# Patient Record
Sex: FEMALE | Race: WHITE | URBAN - METROPOLITAN AREA
[De-identification: names, ages, dates, MRNs, and addresses within clinical notes are randomized per-mention and may not be internally consistent; named-entity substitution may affect disease eponyms.]

---

## 2017-01-05 DIAGNOSIS — Z12.11 SPECIAL SCREENING FOR MALIGNANT NEOPLASMS, COLON: ICD-10-CM

## 2017-01-05 DIAGNOSIS — G57.01 LESION OF SCIATIC NERVE, RIGHT: Primary | ICD-10-CM

## 2017-01-06 RX ORDER — CYCLOBENZAPRINE HCL 10 MG
5-10 TABLET ORAL
Qty: 30 TABLET | Refills: 0 | Status: SHIPPED | OUTPATIENT
Start: 2017-01-06 | End: 2019-01-04

## 2017-01-06 NOTE — TELEPHONE ENCOUNTER
Left voice message asking patient to call triage back.   Rx was sent to pharmacy but PCP is requesting appt to discuss other medications.  Asked pt to call back to review details of PCP's message.

## 2017-01-06 NOTE — TELEPHONE ENCOUNTER
Patient really needs a follow up appointment - was supposed to follow up in December for start of SSRI, sleep, and cholesterol (didn't want to start med, but I'd like to discuss with her).    Could you let her know I'll fill for one time, but should make appointment for further refills (and to discuss above)?    ALSO NEEDS TO SEND IN FIT!!    Thanks,  Lacey Morley MD

## 2017-01-06 NOTE — TELEPHONE ENCOUNTER
cyclobenzaprine (FLEXERIL) 10 MG tablet   Last Written Prescription Date:  11/23/16  Last Fill Quantity: 30,   # refills: 1  Last Office Visit with [facility], [facility] or [facility] prescribing provider: 11/23/16, complete physical exam   Future Office visit:       Routing refill request to provider for review/approval because:  Drug not on the [facility], [facility] or [facility] refill protocol or controlled substance    Needs Dx

## 2017-01-09 NOTE — TELEPHONE ENCOUNTER
Patient called back clinic, she is unable to make a f/u appt until March, no insurance for January and February.   She stopped taking Zoloft after 2-3 days because she had very loose diarrhea, couple times she barely made it to the restroom, diarrhea went away when she stopped.  She still has 2 months left of Zoloft at home. Asking if diarrhea would go away eventually if she restarts it or should she take a lower dose?   Requests a quick call from PCP.   Will route to PCP, okay to be addressed when back in the clinic.

## 2017-10-03 ENCOUNTER — OFFICE VISIT (OUTPATIENT)
Dept: FAMILY MEDICINE | Facility: CLINIC | Age: 67
End: 2017-10-03
Payer: MEDICARE

## 2017-10-03 ENCOUNTER — RADIANT APPOINTMENT (OUTPATIENT)
Dept: GENERAL RADIOLOGY | Facility: CLINIC | Age: 67
End: 2017-10-03
Attending: FAMILY MEDICINE
Payer: MEDICARE

## 2017-10-03 ENCOUNTER — TELEPHONE (OUTPATIENT)
Dept: FAMILY MEDICINE | Facility: CLINIC | Age: 67
End: 2017-10-03

## 2017-10-03 VITALS
WEIGHT: 210 LBS | DIASTOLIC BLOOD PRESSURE: 64 MMHG | BODY MASS INDEX: 33.89 KG/M2 | RESPIRATION RATE: 16 BRPM | TEMPERATURE: 98.5 F | OXYGEN SATURATION: 94 % | SYSTOLIC BLOOD PRESSURE: 120 MMHG | HEART RATE: 88 BPM

## 2017-10-03 DIAGNOSIS — S92.515A CLOSED NONDISPLACED FRACTURE OF PROXIMAL PHALANX OF LESSER TOE OF LEFT FOOT, INITIAL ENCOUNTER: ICD-10-CM

## 2017-10-03 DIAGNOSIS — S99.922A INJURY OF TOE, LEFT, INITIAL ENCOUNTER: ICD-10-CM

## 2017-10-03 DIAGNOSIS — S99.922A INJURY OF TOE, LEFT, INITIAL ENCOUNTER: Primary | ICD-10-CM

## 2017-10-03 PROCEDURE — 73660 X-RAY EXAM OF TOE(S): CPT | Mod: LT

## 2017-10-03 PROCEDURE — 99214 OFFICE O/P EST MOD 30 MIN: CPT | Performed by: FAMILY MEDICINE

## 2017-10-03 NOTE — PROGRESS NOTES
"  SUBJECTIVE:   Racquel Torres is a 67 year old female who presents to clinic today for the following health issues:    toe      Duration: 1 day    Description (location/character/radiation): pinky toe on left foot    Intensity:  moderate    Accompanying signs and symptoms: pain, hard to put pressure on it    History (similar episodes/previous evaluation): None    Precipitating or alleviating factors: None    Therapies tried and outcome: ice     67 year old who stubbed toe last night and looked down and \"pinky toe pushed out in weird direction!\".  Painful, but pushed it back into place and \"heard a clunk\"  Since then red, tender, painful and hard to walk but much better today than yesterday.  Wants to be sure not broken.  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    Problem list and histories reviewed & adjusted, as indicated.  Additional history: as documented    Reviewed and updated as needed this visit by clinical staffTobacco  Allergies  Meds  Med Hx  Surg Hx  Fam Hx  Soc Hx      Reviewed and updated as needed this visit by Provider  Meds       ROS:  Constitutional, HEENT, cardiovascular, pulmonary, gi and gu systems are negative, except as otherwise noted.  OBJECTIVE:   /64  Pulse 88  Temp 98.5  F (36.9  C) (Tympanic)  Resp 16  Wt 210 lb (95.3 kg)  SpO2 94%  BMI 33.89 kg/m2  Body mass index is 33.89 kg/(m^2).  GENERAL: healthy, alert and no distress  MS: LLE exam shows left lateral distal foot by pinky toe erythematous and swollen.  Tender to touch.  Good ROM of all 5 digits and good blood flow and cap refill. Sensation intact.    Diagnostic Test Results:  Xray read by me: I think I see small non-displaced fracture in left distal area of proximal phalanx of 5th digit    ASSESSMENT/PLAN:         Racquel was seen today for toe injury.    Diagnoses and all orders for this visit:    Injury of toe, left, initial encounter  Comments:  Fx.  Work note given.  Discussed keep still, avoid " bending.  Follow up if not improving/resolve by 6 weeks, sooner if worsening.  Orders:  -     XR Toe Left G/E 2 Views; Future  -     acetaminophen-codeine (TYLENOL #3) 300-30 MG per tablet; Take 1-2 tablets by mouth nightly as needed for pain maximum 2 tablet(s) per day    Closed nondisplaced fracture of proximal phalanx of lesser toe of left foot, initial encounter        Patient Instructions     Closed Toe Fracture  Your toe is broken (fractured). This causes local pain, swelling, and sometimes bruising. This injury usually takes about 4 to 6 weeks to heal, but can sometimes take longer. Toe injuries are often treated by taping the injured toe to the next one (buddy taping). This protects the injured toe and holds it in position.     If the toenail has been severely injured, it may fall off in 1 to 2 weeks. It takes up to 12 months for a new toenail to grow back.  Home care  Follow these guidelines when caring for yourself at home:    You may be given a cast shoe to wear to keep your toe from moving. If not, you can use a sandal or any shoe that doesn t put pressure on the injured toe until the swelling and pain go away. If using a sandal, be careful not to strike your foot against anything. Another injury could make the fracture worse. If you were given crutches, don t put full weight on the injured foot until you can do so without pain, or as directed by your healthcare provider.    Keep your foot elevated to reduce pain and swelling. When sleeping, put a pillow under the injured leg. When sitting, support the injured leg so it is above your waist. This is very important during the first 2 days (48 hours).    Put an ice pack on the injured area. Do this for 20 minutes every 1 to 2 hours the first day for pain relief. You can make an ice pack by wrapping a plastic bag of ice cubes in a thin towel. As the ice melts, be careful that any cloth or paper tape doesn t get wet. Continue using the ice pack 3 to 4 times a  day for the next 2 days. Then use the ice pack as needed to ease pain and swelling.    If buddy tape was used and it becomes wet or dirty, change it. You may replace it with paper, plastic, or cloth tape. Cloth tape and paper tapes must be kept dry.    You may use acetaminophen or ibuprofen to control pain, unless another pain medicine was prescribed. If you have chronic liver or kidney disease, talk with your healthcare provider before using these medicines. Also talk with your provider if you ve had a stomach ulcer or gastrointestinal bleeding.    You may return to sports or physical education activities after 4 weeks when you can run without pain, or as directed by your healthcare provider.  Follow-up care  Follow up with your healthcare provider in 1 week, or as advised. This is to make sure the bone is healing the way it should.  X-rays may be taken. You will be told of any new findings that may affect your care.  When to seek medical advice  Call your healthcare provider right away if any of these occur:    Pain or swelling gets worse    The cast/splint cracks    The cast and padding get wet and stays wet more than 24 hours    Bad odor from the cast/splint or wound fluid stains the cast    Tightness or pressure under the cast/splint gets worse    Toe becomes cold, blue, numb, or tingly    You can t move the toe    Signs of infection: fever, redness, warmth, swelling, or drainage from the wound or cast    Fever of 100.4 F (38 C) or higher, or as directed by your healthcare provider  Date Last Reviewed: 2/1/2017 2000-2017 [employer]. [address] [place] [address], [place] [address]. All rights reserved. This information is not intended as a substitute for professional medical care. Always follow your healthcare professional's instructions.            Lacey Morley MD  [facility]

## 2017-10-03 NOTE — NURSING NOTE
"Chief Complaint   Patient presents with     Toe Injury       Initial /64  Pulse 88  Temp 98.5  F (36.9  C) (Tympanic)  Resp 16  Wt 210 lb (95.3 kg)  SpO2 94%  BMI 33.89 kg/m2 Estimated body mass index is 33.89 kg/(m^2) as calculated from the following:    Height as of 11/23/16: 5' 6\" (1.676 m).    Weight as of this encounter: 210 lb (95.3 kg).  Medication Reconciliation: complete   .Yue RECINOS      "

## 2017-10-03 NOTE — LETTER
[place] [id number] [facility]  [address]  [facility] [address]  Phone: [phone]  Fax: [phone]    October 3, 2017        Racquel Torres  [address]  [place] [address]          To whom it may concern:    RE: Racquel Torres    Patient was seen and treated today at our clinic and missed work.  Patient may return to work 10/5/17 with the following:  Light duty-must be able to sit to rest and elevate foot.  Walk/Stand: As tolerated, please allow her to rest and put up foot as needed.    Please contact me for questions or concerns.      Sincerely,        Lacey Morley MD

## 2017-10-03 NOTE — TELEPHONE ENCOUNTER
Patient pushed toe back in place , iced it . Painful, swollen.  Appointment madefor today to r/o fracture

## 2017-10-03 NOTE — PATIENT INSTRUCTIONS
Closed Toe Fracture  Your toe is broken (fractured). This causes local pain, swelling, and sometimes bruising. This injury usually takes about 4 to 6 weeks to heal, but can sometimes take longer. Toe injuries are often treated by taping the injured toe to the next one (buddy taping). This protects the injured toe and holds it in position.     If the toenail has been severely injured, it may fall off in 1 to 2 weeks. It takes up to 12 months for a new toenail to grow back.  Home care  Follow these guidelines when caring for yourself at home:    You may be given a cast shoe to wear to keep your toe from moving. If not, you can use a sandal or any shoe that doesn t put pressure on the injured toe until the swelling and pain go away. If using a sandal, be careful not to strike your foot against anything. Another injury could make the fracture worse. If you were given crutches, don t put full weight on the injured foot until you can do so without pain, or as directed by your healthcare provider.    Keep your foot elevated to reduce pain and swelling. When sleeping, put a pillow under the injured leg. When sitting, support the injured leg so it is above your waist. This is very important during the first 2 days (48 hours).    Put an ice pack on the injured area. Do this for 20 minutes every 1 to 2 hours the first day for pain relief. You can make an ice pack by wrapping a plastic bag of ice cubes in a thin towel. As the ice melts, be careful that any cloth or paper tape doesn t get wet. Continue using the ice pack 3 to 4 times a day for the next 2 days. Then use the ice pack as needed to ease pain and swelling.    If buddy tape was used and it becomes wet or dirty, change it. You may replace it with paper, plastic, or cloth tape. Cloth tape and paper tapes must be kept dry.    You may use acetaminophen or ibuprofen to control pain, unless another pain medicine was prescribed. If you have chronic liver or kidney disease,  talk with your healthcare provider before using these medicines. Also talk with your provider if you ve had a stomach ulcer or gastrointestinal bleeding.    You may return to sports or physical education activities after 4 weeks when you can run without pain, or as directed by your healthcare provider.  Follow-up care  Follow up with your healthcare provider in 1 week, or as advised. This is to make sure the bone is healing the way it should.  X-rays may be taken. You will be told of any new findings that may affect your care.  When to seek medical advice  Call your healthcare provider right away if any of these occur:    Pain or swelling gets worse    The cast/splint cracks    The cast and padding get wet and stays wet more than 24 hours    Bad odor from the cast/splint or wound fluid stains the cast    Tightness or pressure under the cast/splint gets worse    Toe becomes cold, blue, numb, or tingly    You can t move the toe    Signs of infection: fever, redness, warmth, swelling, or drainage from the wound or cast    Fever of 100.4 F (38 C) or higher, or as directed by your healthcare provider  Date Last Reviewed: 2/1/2017 2000-2017 [employer]. [address] [place] [address]. All rights reserved. This information is not intended as a substitute for professional medical care. Always follow your healthcare professional's instructions.

## 2017-10-03 NOTE — TELEPHONE ENCOUNTER
Reason for call:  Patient reporting a symptom    Symptom or request:    Injured toe   Hurt toe last night    It went sideways (sticking out)   Her daughter told her to push it back in so she did    Now she wants to know what she should do    Duration (how long have symptoms been present): one day    Have you been treated for this before? No    Additional comments:   Please call the patient    Phone Number patient can be reached at:  Other phone number:    [phone]  Best Time:   anytime    Can we leave a detailed message on this number:  YES    Call taken on 10/3/2017 at 8:23 AM by JACQUI ASH

## 2017-10-03 NOTE — MR AVS SNAPSHOT
After Visit Summary   10/3/2017    Racquel Torres    MRN: [id number]           Patient Information     Date Of Birth          1950        Visit Information        Provider Department      10/3/2017 1:20 PM Lacey Morley MD [facility]        Today's Diagnoses     Injury of toe, left, initial encounter    -  1    Closed nondisplaced fracture of proximal phalanx of lesser toe of left foot, initial encounter          Care Instructions      Closed Toe Fracture  Your toe is broken (fractured). This causes local pain, swelling, and sometimes bruising. This injury usually takes about 4 to 6 weeks to heal, but can sometimes take longer. Toe injuries are often treated by taping the injured toe to the next one (gómez taping). This protects the injured toe and holds it in position.     If the toenail has been severely injured, it may fall off in 1 to 2 weeks. It takes up to 12 months for a new toenail to grow back.  Home care  Follow these guidelines when caring for yourself at home:    You may be given a cast shoe to wear to keep your toe from moving. If not, you can use a sandal or any shoe that doesn t put pressure on the injured toe until the swelling and pain go away. If using a sandal, be careful not to strike your foot against anything. Another injury could make the fracture worse. If you were given crutches, don t put full weight on the injured foot until you can do so without pain, or as directed by your healthcare provider.    Keep your foot elevated to reduce pain and swelling. When sleeping, put a pillow under the injured leg. When sitting, support the injured leg so it is above your waist. This is very important during the first 2 days (48 hours).    Put an ice pack on the injured area. Do this for 20 minutes every 1 to 2 hours the first day for pain relief. You can make an ice pack by wrapping a plastic bag of ice cubes in a thin towel. As the ice melts,  be careful that any cloth or paper tape doesn t get wet. Continue using the ice pack 3 to 4 times a day for the next 2 days. Then use the ice pack as needed to ease pain and swelling.    If buddy tape was used and it becomes wet or dirty, change it. You may replace it with paper, plastic, or cloth tape. Cloth tape and paper tapes must be kept dry.    You may use acetaminophen or ibuprofen to control pain, unless another pain medicine was prescribed. If you have chronic liver or kidney disease, talk with your healthcare provider before using these medicines. Also talk with your provider if you ve had a stomach ulcer or gastrointestinal bleeding.    You may return to sports or physical education activities after 4 weeks when you can run without pain, or as directed by your healthcare provider.  Follow-up care  Follow up with your healthcare provider in 1 week, or as advised. This is to make sure the bone is healing the way it should.  X-rays may be taken. You will be told of any new findings that may affect your care.  When to seek medical advice  Call your healthcare provider right away if any of these occur:    Pain or swelling gets worse    The cast/splint cracks    The cast and padding get wet and stays wet more than 24 hours    Bad odor from the cast/splint or wound fluid stains the cast    Tightness or pressure under the cast/splint gets worse    Toe becomes cold, blue, numb, or tingly    You can t move the toe    Signs of infection: fever, redness, warmth, swelling, or drainage from the wound or cast    Fever of 100.4 F (38 C) or higher, or as directed by your healthcare provider  Date Last Reviewed: 2/1/2017 2000-2017 [employer]. [address] [place] [address], [place] [address]. All rights reserved. This information is not intended as a substitute for professional medical care. Always follow your healthcare professional's instructions.                Follow-ups after your visit        Who to contact  "    If you have questions or need follow up information about today's clinic visit or your schedule please contact [facility] directly at [phone].  Normal or non-critical lab and imaging results will be communicated to you by MyChart, letter or phone within 4 business days after the clinic has received the results. If you do not hear from us within 7 days, please contact the clinic through [employer] or phone. If you have a critical or abnormal lab result, we will notify you by phone as soon as possible.  Submit refill requests through [employer] or call your pharmacy and they will forward the refill request to us. Please allow 3 business days for your refill to be completed.          Additional Information About Your Visit        [employer] Information     [employer] lets you send messages to your doctor, view your test results, renew your prescriptions, schedule appointments and more. To sign up, go to [email or web address] . Click on \"Log in\" on the left side of the screen, which will take you to the Welcome page. Then click on \"Sign up Now\" on the right side of the page.     You will be asked to enter the access code listed below, as well as some personal information. Please follow the directions to create your username and password.     Your access code is: [email or web address]  Expires: 2018  5:42 PM     Your access code will  in 90 days. If you need help or a new code, please call your [place] clinic or [phone].        Care EveryWhere ID     This is your Care EveryWhere ID. This could be used by other organizations to access your [place] medical records  [id number]        Your Vitals Were     Pulse Temperature Respirations Pulse Oximetry BMI (Body Mass Index)       88 98.5  F (36.9  C) (Tympanic) 16 94% 33.89 kg/m2        Blood Pressure from Last 3 Encounters:   10/03/17 120/64   16 120/70   09/16/15 120/87    Weight from Last 3 Encounters:   10/03/17 210 lb " (95.3 kg)   11/23/16 202 lb (91.6 kg)   02/23/15 205 lb (93 kg)                 Today's Medication Changes          These changes are accurate as of: 10/3/17  5:42 PM.  If you have any questions, ask your nurse or doctor.               Start taking these medicines.        Dose/Directions    acetaminophen-codeine 300-30 MG per tablet   Commonly known as:  TYLENOL #3   Used for:  Injury of toe, left, initial encounter   Started by:  Lacey Morley MD        Dose:  1-2 tablet   Take 1-2 tablets by mouth nightly as needed for pain maximum 2 tablet(s) per day   Quantity:  6 tablet   Refills:  0         Stop taking these medicines if you haven't already. Please contact your care team if you have questions.     sertraline 25 MG tablet   Commonly known as:  ZOLOFT   Stopped by:  Lacey Morley MD                Where to get your medicines      Some of these will need a paper prescription and others can be bought over the counter.  Ask your nurse if you have questions.     Bring a paper prescription for each of these medications     acetaminophen-codeine 300-30 MG per tablet                Primary Care Provider Office Phone # Fax #    Lacey Morley [phone]       [address] [place] [address]        Equal Access to Services     ALLEN HERMOSILLO AH: Kriss abreuo Sokaren, waaxda luqadaha, qaybta kaalmada adeegyada, bri wolfe. So [facility] [place] [phone].    ATENCIÓN: Si habla español, tiene a livingston disposición servicios gratuitos de asistencia lingüística. Llame al [phone].    We comply with applicable federal civil rights laws and [place] laws. We do not discriminate on the basis of race, color, national origin, age, disability, sex, sexual orientation, or gender identity.            Thank you!     Thank you for choosing [facility]  for your care. Our goal is always to provide you with excellent care. Hearing back from  our patients is one way we can continue to improve our services. Please take a few minutes to complete the written survey that you may receive in the mail after your visit with us. Thank you!             Your Updated Medication List - Protect others around you: Learn how to safely use, store and throw away your medicines at www.disposemymeds.org.          This list is accurate as of: 10/3/17  5:42 PM.  Always use your most recent med list.                   Brand Name Dispense Instructions for use Diagnosis    acetaminophen-codeine 300-30 MG per tablet    TYLENOL #3    6 tablet    Take 1-2 tablets by mouth nightly as needed for pain maximum 2 tablet(s) per day    Injury of toe, left, initial encounter       cyclobenzaprine 10 MG tablet    FLEXERIL    30 tablet    Take 0.5-1 tablets (5-10 mg) by mouth nightly as needed for muscle spasms    Lesion of sciatic nerve, right

## 2017-10-04 NOTE — PROGRESS NOTES
This lab was reviewed with patient in office; see my office visit note for details.   Lacey Morley MD

## 2017-10-18 ENCOUNTER — TELEPHONE (OUTPATIENT)
Dept: FAMILY MEDICINE | Facility: CLINIC | Age: 67
End: 2017-10-18

## 2017-10-18 DIAGNOSIS — Z12.11 SPECIAL SCREENING FOR MALIGNANT NEOPLASMS, COLON: Primary | ICD-10-CM

## 2017-10-18 NOTE — TELEPHONE ENCOUNTER
Agreed to FIT, will call back to schedule mammogram and stated she wanted to have the physical the same day. She will call back after reviewing her work schedule.

## 2017-10-18 NOTE — TELEPHONE ENCOUNTER
10/18/2017    Call Regarding Preventive Health Screening Colonoscopy and Mammogram    Attempt 1    Message on voicemail     Comments: Home: Invalid number      Outreach   CC

## 2017-10-18 NOTE — TELEPHONE ENCOUNTER
Called the patient for Health Maintenance, agreed to FIT test screening. Order placed for provider to sign.     The patient request FIT be sent to home address:  [address] [place] [address] [address] [place] [id number]

## 2018-05-07 ENCOUNTER — OFFICE VISIT (OUTPATIENT)
Dept: FAMILY MEDICINE | Facility: CLINIC | Age: 68
End: 2018-05-07
Payer: COMMERCIAL

## 2018-05-07 VITALS
OXYGEN SATURATION: 95 % | SYSTOLIC BLOOD PRESSURE: 120 MMHG | HEART RATE: 71 BPM | WEIGHT: 203 LBS | DIASTOLIC BLOOD PRESSURE: 64 MMHG | BODY MASS INDEX: 31.86 KG/M2 | HEIGHT: 67 IN | TEMPERATURE: 98.3 F | RESPIRATION RATE: 16 BRPM

## 2018-05-07 DIAGNOSIS — F17.210 CIGARETTE NICOTINE DEPENDENCE WITHOUT COMPLICATION: ICD-10-CM

## 2018-05-07 DIAGNOSIS — Z00.00 ROUTINE GENERAL MEDICAL EXAMINATION AT A HEALTH CARE FACILITY: Primary | ICD-10-CM

## 2018-05-07 DIAGNOSIS — Z12.2 ENCOUNTER FOR SCREENING FOR LUNG CANCER: ICD-10-CM

## 2018-05-07 DIAGNOSIS — R39.9 URINARY SYMPTOM OR SIGN: ICD-10-CM

## 2018-05-07 DIAGNOSIS — H43.393 FLOATERS IN VISUAL FIELD, BILATERAL: ICD-10-CM

## 2018-05-07 DIAGNOSIS — Z13.1 SCREENING FOR DIABETES MELLITUS: ICD-10-CM

## 2018-05-07 DIAGNOSIS — E78.5 HYPERLIPIDEMIA, UNSPECIFIED HYPERLIPIDEMIA TYPE: ICD-10-CM

## 2018-05-07 DIAGNOSIS — Z12.39 SCREENING FOR BREAST CANCER: ICD-10-CM

## 2018-05-07 DIAGNOSIS — Z78.0 ASYMPTOMATIC POSTMENOPAUSAL STATUS: ICD-10-CM

## 2018-05-07 DIAGNOSIS — Z12.11 SPECIAL SCREENING FOR MALIGNANT NEOPLASMS, COLON: ICD-10-CM

## 2018-05-07 LAB
ALBUMIN UR-MCNC: NEGATIVE MG/DL
APPEARANCE UR: CLEAR
BILIRUB UR QL STRIP: NEGATIVE
CHOLEST SERPL-MCNC: 194 MG/DL
COLOR UR AUTO: YELLOW
GLUCOSE SERPL-MCNC: 98 MG/DL (ref 70–99)
GLUCOSE UR STRIP-MCNC: NEGATIVE MG/DL
HDLC SERPL-MCNC: 40 MG/DL
HGB UR QL STRIP: NEGATIVE
KETONES UR STRIP-MCNC: NEGATIVE MG/DL
LDLC SERPL CALC-MCNC: 130 MG/DL
LEUKOCYTE ESTERASE UR QL STRIP: NEGATIVE
NITRATE UR QL: NEGATIVE
NONHDLC SERPL-MCNC: 154 MG/DL
PH UR STRIP: 5.5 PH (ref 5–7)
SOURCE: NORMAL
SP GR UR STRIP: 1.02 (ref 1–1.03)
TRIGL SERPL-MCNC: 119 MG/DL
UROBILINOGEN UR STRIP-ACNC: 1 EU/DL (ref 0.2–1)

## 2018-05-07 PROCEDURE — 36415 COLL VENOUS BLD VENIPUNCTURE: CPT | Performed by: FAMILY MEDICINE

## 2018-05-07 PROCEDURE — 99213 OFFICE O/P EST LOW 20 MIN: CPT | Mod: 25 | Performed by: FAMILY MEDICINE

## 2018-05-07 PROCEDURE — 99397 PER PM REEVAL EST PAT 65+ YR: CPT | Mod: 25 | Performed by: FAMILY MEDICINE

## 2018-05-07 PROCEDURE — 81003 URINALYSIS AUTO W/O SCOPE: CPT | Performed by: FAMILY MEDICINE

## 2018-05-07 PROCEDURE — G0009 ADMIN PNEUMOCOCCAL VACCINE: HCPCS | Performed by: FAMILY MEDICINE

## 2018-05-07 PROCEDURE — 80061 LIPID PANEL: CPT | Performed by: FAMILY MEDICINE

## 2018-05-07 PROCEDURE — 90670 PCV13 VACCINE IM: CPT | Performed by: FAMILY MEDICINE

## 2018-05-07 PROCEDURE — 82947 ASSAY GLUCOSE BLOOD QUANT: CPT | Performed by: FAMILY MEDICINE

## 2018-05-07 NOTE — PATIENT INSTRUCTIONS
Mammogram [phone]  Please schedule DEXA (bone scan) at the   Please ask insurance if a CT scan of your lungs is covered for lung cancer screening       Preventive Health Recommendations    Female Ages 65 +    Yearly exam:     See your health care provider every year in order to  o Review health changes.   o Discuss preventive care.    o Review your medicines if your doctor has prescribed any.      You no longer need a yearly Pap test unless you've had an abnormal Pap test in the past 10 years. If you have vaginal symptoms, such as bleeding or discharge, be sure to talk with your provider about a Pap test.      Every 1 to 2 years, have a mammogram.  If you are over 69, talk with your health care provider about whether or not you want to continue having screening mammograms.      Every 10 years, have a colonoscopy. Or, have a yearly FIT test (stool test). These exams will check for colon cancer.       Have a cholesterol test every 5 years, or more often if your doctor advises it.       Have a diabetes test (fasting glucose) every three years. If you are at risk for diabetes, you should have this test more often.       At age 65, have a bone density scan (DEXA) to check for osteoporosis (brittle bone disease).    Shots:    Get a flu shot each year.    Get a tetanus shot every 10 years.    Talk to your doctor about your pneumonia vaccines. There are now two you should receive - Pneumovax (PPSV 23) and Prevnar (PCV 13).    Talk to your doctor about the shingles vaccine.    Talk to your doctor about the hepatitis B vaccine.    Nutrition:     Eat at least 5 servings of fruits and vegetables each day.      Eat whole-grain bread, whole-wheat pasta and brown rice instead of white grains and rice.      Talk to your provider about Calcium and Vitamin D.     Lifestyle    Exercise at least 150 minutes a week (30 minutes a day, 5 days a week). This will help you control your weight and prevent disease.      Limit  alcohol to one drink per day.      No smoking.       Wear sunscreen to prevent skin cancer.       See your dentist twice a year for an exam and cleaning.      See your eye doctor every 1 to 2 years to screen for conditions such as glaucoma, macular degeneration and cataracts.

## 2018-05-07 NOTE — NURSING NOTE
Screening Questionnaire for Adult Immunization     Are you sick today?   No    Do you have allergies to medications, food or any vaccine?   No    Have you ever had a serious reaction after receiving a vaccination?   No    Do you have a long-term health problem with heart disease, lung disease,  asthma, kidney disease, diabetes, anemia, metabolic or blood disease?   No    Do you have cancer, leukemia, AIDS, or any immune system problem?   No    Do you take cortisone, prednisone, other steroids, or anticancer drugs, or  have you had any x-ray (radiation) treatments?   No    Have you had a seizure, brain, or other nervous system problem?   No    During the past year, have you received a transfusion of blood or blood       products, or been given a medicine called immune (gamma) globulin?   No    For women: Are you pregnant or is there a chance you could become         pregnant during the next month?   No    Have you received any vaccinations in the past 4 weeks?   No     Immunization questionnaire answers were all negative.      MNVFC doesn't apply on this patient      Per orders of Dr. Whitley , injection of PCV 13  given by MICHELLE MELCHOR. Patient instructed to remain in clinic for 20 minutes afterwards, and to report any adverse reaction to me immediately.    Prior to injection verified patient identity using patient's name and date of birth.         Screening performed by Michelle Melchor MA

## 2018-05-07 NOTE — PROGRESS NOTES
SUBJECTIVE:   Racquel Torres is a 68 year old female who presents for Preventive Visit.    Are you in the first 12 months of your Medicare Part B coverage?  No    Healthy Habits:    Do you get at least three servings of calcium containing foods daily (dairy, green leafy vegetables, etc.)? no, taking calcium and/or vitamin D supplement: no    Amount of exercise or daily activities, outside of work: 7 day(s) per week    Problems taking medications regularly No    Medication side effects: No    Have you had an eye exam in the past two years? no    Do you see a dentist twice per year? no    Do you have sleep apnea, excessive snoring or daytime drowsiness?DAytine Drowsiness       Ability to successfully perform activities of daily living: Yes, no assistance needed    Home safety:  none identified     Hearing impairment: No    Fall risk:  Fallen 2 or more times in the past year?: No  Any fall with injury in the past year?: No        COGNITIVE SCREEN  1) Repeat 3 items (Banana, Sunrise, Chair)    2) Clock draw: NORMAL  3) 3 item recall: Recalls 2 objects   Results: NORMAL clock, 1-2 items recalled: COGNITIVE IMPAIRMENT LESS LIKELY    Mini-CogTM Copyright S Demetrius. Licensed by the author for use in [facility]; reprinted with permission ([email or web address]). All rights reserved.            Reviewed and updated as needed this visit by clinical staff         Reviewed and updated as needed this visit by Provider        Social History   Substance Use Topics     Smoking status: Current Every Day Smoker     Packs/day: 0.50     Types: Cigarettes     Smokeless tobacco: Never Used     Alcohol use Yes      Comment: occ.       If you drink alcohol do you typically have >3 drinks per day or >7 drinks per week? No                        Today's PHQ-2 Score:   PHQ-2 ( 1999 Pfizer) 11/23/2016 2/23/2015   Q1: Little interest or pleasure in doing things 0 1   Q2: Feeling down, depressed or hopeless 0 0   PHQ-2 Score 0 1       Do  you feel safe in your environment - Yes    Do you have a Health Care Directive?: No: Advance care planning reviewed with patient; information given to patient to review.    All day while at work pt doesn't have to urinate. After work she has to urinate 4-5 times. She only urinates once at work. She feels that she drinks more fluids while at home. Once every 2 weeks she feels left pain in her groin which doesn't last very long. She has occasional incontinence.     She has occasional sweats. Sometimes she sits in front of a fan and symptoms improve.     Pt is smoking a pack every 1.5 days. She doesn't smoke if she is working. She started smoking at age 15-16 years.     Current providers sharing in care for this patient include:   Patient Care Team:  Rena Whitley MD as PCP - General (Family Practice)    The following health maintenance items are reviewed in Epic and correct as of today:  Health Maintenance   Topic Date Due     FIT Q1 YR  03/31/1962     ADVANCE DIRECTIVE PLANNING Q5 YRS  03/31/2007     DEXA SCAN SCREENING (SYSTEM ASSIGNED)  03/31/2017     PNEUMOCOCCAL (1 of 2 - PCV13) 03/31/2017     MAMMO SCREEN Q2 YR (SYSTEM ASSIGNED)  10/16/2017     INFLUENZA VACCINE (Season Ended) 09/01/2018     FALL RISK ASSESSMENT  10/03/2018     LIPID SCREEN Q5 YR FEMALE (SYSTEM ASSIGNED)  11/23/2021     TETANUS IMMUNIZATION (SYSTEM ASSIGNED)  02/05/2025     HEPATITIS C SCREENING  Completed     Labs reviewed in EPIC    Pneumonia Vaccine:Adults age 65+ who have not received previous Pneumovax (PPSV23) or PCV13 as an adult: Should first be given PCV13 AND then should be given PPSV23 6-12 months after PCV13  Mammogram Screening: Patient over age 50, mutual decision to screen reflected in health maintenance.    ROS:  + floaters which are new   Constitutional, HEENT, cardiovascular, pulmonary, gi and gu systems are negative, except as otherwise noted.    OBJECTIVE:     /64  Pulse 71  Temp 98.3  F (36.8  C) (Oral)   "Resp 16  Ht 5' 6.5\" (1.689 m)  Wt 203 lb (92.1 kg)  SpO2 95%  BMI 32.27 kg/m2  EXAM:   GENERAL APPEARANCE: healthy, alert and no distress  EYES: Eyes grossly normal to inspection, conjunctivae and sclerae normal  HENT: nose and mouth without ulcers or lesions, oropharynx clear and oral mucous membranes moist  NECK: no adenopathy, no asymmetry, masses, or scars and thyroid normal to palpation  RESP: lungs clear to auscultation - no rales, rhonchi or wheezes  CV: regular rate and rhythm, normal S1 S2  ABDOMEN: soft, nontender, no hepatosplenomegaly, no masses and bowel sounds normal   (female): normal female external genitalia  MS: no musculoskeletal defects are noted and gait is age appropriate without ataxia  SKIN: no suspicious lesions or rashes  NEURO:  mentation intact and speech normal  PSYCH: mentation appears normal and affect normal/bright    ASSESSMENT / PLAN:       1. Routine general medical examination at a health care facility  - see below     2. Hyperlipidemia, unspecified hyperlipidemia type  - advised low fat diet and increased activity   - Lipid Profile (Chol, Trig, HDL, LDL calc)    3. Urinary symptom or sign  - symptoms likely due to increased water intake in the afternoon/evening, UA normal, obtained fasting glucose and if elevated will obtain hgba1c to r/u diabetes mellitus   - encouraged to stop caffeine and pop drinks in the afternoon  - continue to monitor   - *UA reflex to Microscopic and Culture ([place] [facility] (except [place] and [place])    4. Asymptomatic postmenopausal status  - DX Hip/Pelvis/Spine; Future    5. Floaters in visual field, bilateral  - OPHTHALMOLOGY ADULT REFERRAL    6. Special screening for malignant neoplasms, colon  - Fecal colorectal cancer screen (FIT); Future    7. Screening for diabetes mellitus  - Glucose  - JUST IN CASE    8. Screening for breast cancer  - MA Diagnostic Digital Bilateral; Future    9. Cigarette nicotine dependence without " "complication  - pt not interested in tx options     10. Encounter for screening for lung cancer   Consider lung cancer screening for ages 55-80 years and 30 pack-year smoking history. Pt will check with insurance to see if CT scan would be covered.       End of Life Planning:  Patient currently has an advanced directive: No.  I have verified the patient's ablity to prepare an advanced directive/make health care decisions.  Literature was provided to assist patient in preparing an advanced directive.    COUNSELING:  Reviewed preventive health counseling, as reflected in patient instructions       Regular exercise       Healthy diet/nutrition       Vision screening       Dental care       Immunizations    Vaccinated for: Pneumococcal             Osteoporosis Prevention/Bone Health       Consider lung cancer screening for ages 55-80 years and 30 pack-year smoking history. Pt will check with insurance to see if CT scan would be covered.         Estimated body mass index is 33.89 kg/(m^2) as calculated from the following:    Height as of 11/23/16: 5' 6\" (1.676 m).    Weight as of 10/3/17: 210 lb (95.3 kg).  Weight management plan: Discussed healthy diet and exercise guidelines and patient will follow up in 12 months in clinic to re-evaluate.     reports that she has been smoking Cigarettes.  She has been smoking about 0.50 packs per day. She has never used smokeless tobacco.  Tobacco Cessation Action Plan: Information offered: Patient not interested at this time    Appropriate preventive services were discussed with this patient, including applicable screening as appropriate for cardiovascular disease, diabetes, osteopenia/osteoporosis, and glaucoma.  As appropriate for age/gender, discussed screening for colorectal cancer, prostate cancer, breast cancer, and cervical cancer. Checklist reviewing preventive services available has been given to the patient.    Reviewed patients plan of care and provided an AVS. The Basic " Care Plan (routine screening as documented in Health Maintenance) for Racquel meets the Care Plan requirement. This Care Plan has been established and reviewed with the Patient.    Counseling Resources:  ATP IV Guidelines  Pooled Cohorts Equation Calculator  Breast Cancer Risk Calculator  FRAX Risk Assessment  ICSI Preventive Guidelines  Dietary Guidelines for Americans, 2010  USDA's MyPlate  ASA Prophylaxis  Lung CA Screening    Rena Whitley MD  [facility]

## 2018-05-07 NOTE — LETTER
May 8, 2018      Racquel Torres  [address]  [place] [facility] [place] [facility] [address]        Dear ,    We are writing to inform you of your test results.    Here are your results for your recent labs.   Your blood sugar was normal, which means you do not have diabetes mellitus.   Your LDL (bad cholesterol) is elevated. You can improve your cholesterol by controlling the amount and type of fat you eat and by increasing your daily activity level.  Here are some ways to improve your nutrition:  Eat less fat (especially butter, Crisco and other saturated fats)  Buy lean cuts of meat; reduce your portions of red meat or substitute poultry or fish  Use skim milk and low-fat dairy products  Eat no more than 4 egg yolks per week  Avoid fried or fast foods that are high in fat  Eat more fruits and vegetables  Please call or message me if you have questions or concerns.     Resulted Orders   Glucose   Result Value Ref Range    Glucose 98 70 - 99 mg/dL      Comment:      Fasting specimen   *UA reflex to Microscopic and Culture ([place] [facility] [facility] (except [place] and [place])   Result Value Ref Range    Color Urine Yellow     Appearance Urine Clear     Glucose Urine Negative NEG^Negative mg/dL    Bilirubin Urine Negative NEG^Negative    Ketones Urine Negative NEG^Negative mg/dL    Specific Gravity Urine 1.025 1.003 - 1.035    Blood Urine Negative NEG^Negative    pH Urine 5.5 5.0 - 7.0 pH    Protein Albumin Urine Negative NEG^Negative mg/dL    Urobilinogen Urine 1.0 0.2 - 1.0 EU/dL    Nitrite Urine Negative NEG^Negative    Leukocyte Esterase Urine Negative NEG^Negative    Source Midstream Urine    Lipid Profile (Chol, Trig, HDL, LDL calc)   Result Value Ref Range    Cholesterol 194 <200 mg/dL    Triglycerides 119 <150 mg/dL      Comment:      Fasting specimen    HDL Cholesterol 40 (L) >49 mg/dL    LDL Cholesterol Calculated 130 (H) <100 mg/dL      Comment:      Above desirable:  100-129 mg/dl  Borderline High:   130-159 mg/dL  High:             160-189 mg/dL  Very high:       >189 mg/dl      Non HDL Cholesterol 154 (H) <130 mg/dL      Comment:      Above Desirable:  130-159 mg/dl  Borderline high:  160-189 mg/dl  High:             190-219 mg/dl  Very high:       >219 mg/dl         If you have any questions or concerns, please call the clinic at the number listed above.       Sincerely,        Rena Whitley MD/nr

## 2018-05-07 NOTE — MR AVS SNAPSHOT
After Visit Summary   5/7/2018    Racquel Torres    MRN: [id number]           Patient Information     Date Of Birth          1950        Visit Information        Provider Department      5/7/2018 8:20 AM Rena Whitley MD [facility]        Today's Diagnoses     Vitreous floaters of both eyes    -  1    Special screening for malignant neoplasms, colon        Hyperlipidemia, unspecified hyperlipidemia type        Screening for diabetes mellitus        Urinary symptom or sign        Screening for breast cancer        Asymptomatic postmenopausal status          Care Instructions    Mammogram [phone]  Please schedule DEXA (bone scan) at the   Please ask insurance if a CT scan of your lungs is covered for lung cancer screening       Preventive Health Recommendations    Female Ages 65 +    Yearly exam:     See your health care provider every year in order to  o Review health changes.   o Discuss preventive care.    o Review your medicines if your doctor has prescribed any.      You no longer need a yearly Pap test unless you've had an abnormal Pap test in the past 10 years. If you have vaginal symptoms, such as bleeding or discharge, be sure to talk with your provider about a Pap test.      Every 1 to 2 years, have a mammogram.  If you are over 69, talk with your health care provider about whether or not you want to continue having screening mammograms.      Every 10 years, have a colonoscopy. Or, have a yearly FIT test (stool test). These exams will check for colon cancer.       Have a cholesterol test every 5 years, or more often if your doctor advises it.       Have a diabetes test (fasting glucose) every three years. If you are at risk for diabetes, you should have this test more often.       At age 65, have a bone density scan (DEXA) to check for osteoporosis (brittle bone disease).    Shots:    Get a flu shot each year.    Get a tetanus shot every 10  years.    Talk to your doctor about your pneumonia vaccines. There are now two you should receive - Pneumovax (PPSV 23) and Prevnar (PCV 13).    Talk to your doctor about the shingles vaccine.    Talk to your doctor about the hepatitis B vaccine.    Nutrition:     Eat at least 5 servings of fruits and vegetables each day.      Eat whole-grain bread, whole-wheat pasta and brown rice instead of white grains and rice.      Talk to your provider about Calcium and Vitamin D.     Lifestyle    Exercise at least 150 minutes a week (30 minutes a day, 5 days a week). This will help you control your weight and prevent disease.      Limit alcohol to one drink per day.      No smoking.       Wear sunscreen to prevent skin cancer.       See your dentist twice a year for an exam and cleaning.      See your eye doctor every 1 to 2 years to screen for conditions such as glaucoma, macular degeneration and cataracts.          Follow-ups after your visit        Additional Services     OPHTHALMOLOGY ADULT REFERRAL       [facility] [phone]   [email or web address]    Please be aware that coverage of these services is subject to the terms and limitations of your health insurance plan.  Call member services at your health plan with any benefit or coverage questions.      Please bring the following with you to your appointment:    (1) Any X-Rays, CTs or MRIs which have been performed.  Contact the facility where they were done to arrange for  prior to your scheduled appointment.    (2) List of current medications  (3) This referral request   (4) Any documents/labs given to you for this referral                  Future tests that were ordered for you today     Open Future Orders        Priority Expected Expires Ordered    DX Hip/Pelvis/Spine Routine  5/7/2019 5/7/2018    MA Diagnostic Digital Bilateral Routine  5/7/2019 5/7/2018    Fecal colorectal  "cancer screen (FIT) Routine 2018            Who to contact     If you have questions or need follow up information about today's clinic visit or your schedule please contact [facility] directly at [phone].  Normal or non-critical lab and imaging results will be communicated to you by MyChart, letter or phone within 4 business days after the clinic has received the results. If you do not hear from us within 7 days, please contact the clinic through MyChart or phone. If you have a critical or abnormal lab result, we will notify you by phone as soon as possible.  Submit refill requests through [employer] or call your pharmacy and they will forward the refill request to us. Please allow 3 business days for your refill to be completed.          Additional Information About Your Visit        MyChart Information     [employer] lets you send messages to your doctor, view your test results, renew your prescriptions, schedule appointments and more. To sign up, go to [email or web address] . Click on \"Log in\" on the left side of the screen, which will take you to the Welcome page. Then click on \"Sign up Now\" on the right side of the page.     You will be asked to enter the access code listed below, as well as some personal information. Please follow the directions to create your username and password.     Your access code is: [email or web address]  Expires: 2018  9:15 AM     Your access code will  in 90 days. If you need help or a new code, please call your [facility] clinic or [phone].        Care EveryWhere ID     This is your Care EveryWhere ID. This could be used by other organizations to access your [facility] medical records  [id number]        Your Vitals Were     Pulse Temperature Respirations Height Pulse Oximetry BMI (Body Mass Index)    71 98.3  F (36.8  C) (Oral) 16 5' 6.5\" (1.689 m) 95% 32.27 kg/m2       Blood Pressure from Last 3 Encounters:   18 120/64   10/03/17 " 120/64   11/23/16 120/70    Weight from Last 3 Encounters:   05/07/18 203 lb (92.1 kg)   10/03/17 210 lb (95.3 kg)   11/23/16 202 lb (91.6 kg)              We Performed the Following     *UA reflex to Microscopic and Culture ([place] [facility] (except [place] and [place])     Glucose     JUST IN CASE     Lipid Profile (Chol, Trig, HDL, LDL calc)     OPHTHALMOLOGY ADULT REFERRAL        Primary Care Provider Office Phone # Fax #    Deqa Orlando Whitley [phone]       [address]  [facility] [address]        Equal Access to Services     [facility] HospitalJENNIFER : Hadii guido Regan, waaxda luqadaha, qaybta kaalmada keke, bri ayon . So [facility] [phone].    ATENCIÓN: Si habla español, tiene a livingston disposición servicios gratuitos de asistencia lingüística. LlSelect Medical Specialty [facility] - [facility] [phone].    We comply with applicable federal civil rights laws and [place] laws. We do not discriminate on the basis of race, color, national origin, age, disability, sex, sexual orientation, or gender identity.            Thank you!     Thank you for choosing [facility]  for your care. Our goal is always to provide you with excellent care. Hearing back from our patients is one way we can continue to improve our services. Please take a few minutes to complete the written survey that you may receive in the mail after your visit with us. Thank you!             Your Updated Medication List - Protect others around you: Learn how to safely use, store and throw away your medicines at www.disposemymeds.org.          This list is accurate as of 5/7/18  9:15 AM.  Always use your most recent med list.                   Brand Name Dispense Instructions for use Diagnosis    cyclobenzaprine 10 MG tablet    FLEXERIL    30 tablet    Take 0.5-1 tablets (5-10 mg) by mouth nightly as needed for muscle spasms    Lesion of sciatic nerve, right

## 2018-05-08 NOTE — PROGRESS NOTES
Please send the letter to the patient with the following.         Here are your results for your recent labs.   Your blood sugar was normal, which means you do not have diabetes mellitus.   Your LDL (bad cholesterol) is elevated. You can improve your cholesterol by controlling the amount and type of fat you eat and by increasing your daily activity level.  Here are some ways to improve your nutrition:  Eat less fat (especially butter, Crisco and other saturated fats)  Buy lean cuts of meat; reduce your portions of red meat or substitute poultry or fish  Use skim milk and low-fat dairy products  Eat no more than 4 egg yolks per week  Avoid fried or fast foods that are high in fat  Eat more fruits and vegetables  Please call or message me if you have questions or concerns.

## 2018-05-17 ENCOUNTER — TELEPHONE (OUTPATIENT)
Dept: INTERNAL MEDICINE | Facility: CLINIC | Age: 68
End: 2018-05-17

## 2018-07-19 ENCOUNTER — OFFICE VISIT (OUTPATIENT)
Dept: FAMILY MEDICINE | Facility: CLINIC | Age: 68
End: 2018-07-19
Payer: COMMERCIAL

## 2018-07-19 VITALS
SYSTOLIC BLOOD PRESSURE: 125 MMHG | DIASTOLIC BLOOD PRESSURE: 78 MMHG | RESPIRATION RATE: 16 BRPM | BODY MASS INDEX: 32.75 KG/M2 | TEMPERATURE: 98.2 F | HEART RATE: 77 BPM | WEIGHT: 206 LBS | OXYGEN SATURATION: 95 %

## 2018-07-19 DIAGNOSIS — G57.01 LESION OF RIGHT SCIATIC NERVE: Primary | ICD-10-CM

## 2018-07-19 PROCEDURE — 99213 OFFICE O/P EST LOW 20 MIN: CPT | Performed by: NURSE PRACTITIONER

## 2018-07-19 RX ORDER — GABAPENTIN 300 MG/1
300 CAPSULE ORAL AT BEDTIME
Qty: 14 CAPSULE | Refills: 0 | Status: SHIPPED | OUTPATIENT
Start: 2018-07-19 | End: 2019-01-04

## 2018-07-19 RX ORDER — PREDNISONE 20 MG/1
20 TABLET ORAL 2 TIMES DAILY
Qty: 10 TABLET | Refills: 0 | Status: SHIPPED | OUTPATIENT
Start: 2018-07-19 | End: 2019-01-04

## 2018-07-19 NOTE — PROGRESS NOTES
SUBJECTIVE:   Racquel Torres is a 68 year old female who presents to clinic today for the following health issues:      Musculoskeletal problem/pain      Duration: 2-3 days    Description  Location: right leg into right buttock    Intensity:  moderate, severe    Accompanying signs and symptoms: feels like devon horse sometimes    History  Previous similar problem: YES- had numbness in leg a few years ago  Previous evaluation:  none    Precipitating or alleviating factors:  Trauma or overuse: no   Aggravating factors include: sitting and standing, walking up stairs    Therapies tried and outcome: ice, acetaminophen, Ibuprofen and Flexeril    Right leg pain started 203 days ago, no acute injury or overuse.  Hx of prior similar occurences, she has gotten flexeril in the past.  Last episode several years ago and never this bad.  Has tried motrin, flexeril, excedrin, works for a bit but then comes back.  Pain shoots from right buttocks all the way down to ankle, feels like devon horse, it is constant.  Pain is worse with laying in bed, going up stairs.  No numbness or tingling in right leg.  Cant get comfortable at night.  No groin pain.  No low back pain.  Hurts to push gas when she drives.  No redness, swelling, or bruising of leg.    2/2015 was evaluated and dx with right sciatica, treated with flexeril and gabapentin.      Patient Active Problem List   Diagnosis     Rotator cuff injury     Injury, other and unspecified, shoulder and upper arm     Lesion of sciatic nerve     Anxiety     History reviewed. No pertinent surgical history.    Social History   Substance Use Topics     Smoking status: Current Every Day Smoker     Packs/day: 0.50     Types: Cigarettes     Smokeless tobacco: Never Used     Alcohol use Yes      Comment: occ.     Family History   Problem Relation Age of Onset     Diabetes Father          Current Outpatient Prescriptions   Medication Sig Dispense Refill     cyclobenzaprine (FLEXERIL) 10 MG  tablet Take 0.5-1 tablets (5-10 mg) by mouth nightly as needed for muscle spasms 30 tablet 0     gabapentin (NEURONTIN) 300 MG capsule Take 1 capsule (300 mg) by mouth At Bedtime 14 capsule 0     predniSONE (DELTASONE) 20 MG tablet Take 1 tablet (20 mg) by mouth 2 times daily 10 tablet 0       ROS:  MSK, neuro, Integ as above, otherwise negative       OBJECTIVE:                                                    /78 (BP Location: Right arm, Patient Position: Chair, Cuff Size: Adult Large)  Pulse 77  Temp 98.2  F (36.8  C) (Oral)  Resp 16  Wt 206 lb (93.4 kg)  SpO2 95%  BMI 32.75 kg/m2   GENERAL APPEARANCE: healthy, alert and no distress  EYES: Eyes grossly normal to inspection and conjunctivae and sclerae normal  RESP: respirations nonlabored   ORTHO: Hip Exam: Palpation: Tender:   none  Non-tender:  right greater trochanter  Range of Motion:  Full ROM, right hip    Lower Leg Exam: Inspection; no swelling, no ecchymosis, no erythema  Palpation: Tender: none  Non-tender: proximal 1/3 tibia, middle 1/3 tibia, distal 1/3 tibia, proximal fibula, distal fibula    Lumber/Thoracic Spine Exam: Tender:  none  Non-tender:  lumbar spinous processes, right para lumbar muscles, right SI joint  Range of Motion: all normal  Strength:  able to heel walk, able to toe walk  Special tests:  negative straight leg raises    NEURO: bilateral lower extremities strength +5/5.  DTR +2 bilaterally    Gait: antalgic gait     PSYCH: mentation appears normal and affect normal/bright      ASSESSMENT/PLAN:                                                    (G57.01) Lesion of right sciatic nerve  (primary encounter diagnosis)  Comment: pain follows L5 nerve root.  No swelling or erythema concerning for DVT.  Plan: predniSONE (DELTASONE) 20 MG tablet, gabapentin        (NEURONTIN) 300 MG capsule, [facility], HAND, AND         CHIROPRACTIC REFERRAL         Discussed good prognosis with conservative management.  Prednisone burst, gentle  exercise, and gabapentin before bed continue flexeril for pain.  Recommended not driving or operating machinery on flexeril.  Discussed avoiding bed rest.  If no improvement in  week, pt will see PT.  If no improvement in 4-6 weeks will refer to specialist.        See Patient Instructions    Yanet Bowers, [facility]    Patient Instructions   1.  I think pain is related to inflammation of sciatic nerve.  Start prednisone (antiinflammatory) 1 pill twice a day for 5 days.  Start gabapentin (for nerve pain) 300mg before bed.  Does cause drowsiness.  Ok to continue flexeril and advil (ibuprofen) 2-3 tabs three times a day.  If not improving by next week see physical therapy.

## 2018-07-19 NOTE — PATIENT INSTRUCTIONS
1.  I think pain is related to inflammation of sciatic nerve.  Start prednisone (antiinflammatory) 1 pill twice a day for 5 days.  Start gabapentin (for nerve pain) 300mg before bed.  Does cause drowsiness.  Ok to continue flexeril and advil (ibuprofen) 2-3 tabs three times a day.  If not improving by next week see physical therapy.

## 2018-07-19 NOTE — MR AVS SNAPSHOT
After Visit Summary   7/19/2018    Racquel Torres    MRN: [id number]           Patient Information     Date Of Birth          1950        Visit Information        Provider Department      7/19/2018 9:00 AM Yanet Bowers APRN CNP [facility]        Today's Diagnoses     Lesion of right sciatic nerve    -  1      Care Instructions    1.  I think pain is related to inflammation of sciatic nerve.  Start prednisone (antiinflammatory) 1 pill twice a day for 5 days.  Start gabapentin (for nerve pain) 300mg before bed.  Does cause drowsiness.  Ok to continue flexeril and advil (ibuprofen) 2-3 tabs three times a day.  If not improving by next week see physical therapy.            Follow-ups after your visit        Additional Services     [facility], HAND, AND CHIROPRACTIC REFERRAL       **This order will print in the [facility] Scheduling Office**    Physical Therapy, Hand Therapy and Chiropractic Care are available through:    *[facility]  *[facility]  *[facility]    Call one number to schedule at any of the above locations: [phone].    Your provider has referred you to: Physical Therapy at [facility] or [facility]    Indication/Reason for Referral: Low Back Pain  Onset of Illness: days  Therapy Orders: Evaluate and Treat  Special Programs: None  Special Request: None    Houston Conrad      Additional Comments for the Therapist or Chiropractor:     Please be aware that coverage of these services is subject to the terms and limitations of your health insurance plan.  Call member services at your health plan with any benefit or coverage questions.      Please bring the following to your appointment:    *Your personal calendar for scheduling future appointments  *Comfortable clothing                  Who to contact     If you have questions or need follow up information about today's clinic visit or your schedule please contact [facility]  [facility] directly at [phone].  Normal or non-critical lab and imaging results will be communicated to you by MyChart, letter or phone within 4 business days after the clinic has received the results. If you do not hear from us within 7 days, please contact the clinic through MyChart or phone. If you have a critical or abnormal lab result, we will notify you by phone as soon as possible.  Submit refill requests through [employer] or call your pharmacy and they will forward the refill request to us. Please allow 3 business days for your refill to be completed.          Additional Information About Your Visit        Care EveryWhere ID     This is your Care EveryWhere ID. This could be used by other organizations to access your [facility] medical records  [id number]        Your Vitals Were     Pulse Temperature Respirations Pulse Oximetry BMI (Body Mass Index)       77 98.2  F (36.8  C) (Oral) 16 95% 32.75 kg/m2        Blood Pressure from Last 3 Encounters:   07/19/18 125/78   05/07/18 120/64   10/03/17 120/64    Weight from Last 3 Encounters:   07/19/18 206 lb (93.4 kg)   05/07/18 203 lb (92.1 kg)   10/03/17 210 lb (95.3 kg)              We Performed the Following     [facility] PT, HAND, AND CHIROPRACTIC REFERRAL          Today's Medication Changes          These changes are accurate as of 7/19/18  9:21 AM.  If you have any questions, ask your nurse or doctor.               Start taking these medicines.        Dose/Directions    gabapentin 300 MG capsule   Commonly known as:  NEURONTIN   Used for:  Lesion of right sciatic nerve   Started by:  Yanet Bowers APRN CNP        Dose:  300 mg   Take 1 capsule (300 mg) by mouth At Bedtime   Quantity:  14 capsule   Refills:  0       predniSONE 20 MG tablet   Commonly known as:  DELTASONE   Used for:  Lesion of right sciatic nerve   Started by:  Yanet Bowers APRN CNP        Dose:  20 mg   Take 1 tablet (20 mg) by mouth 2 times daily   Quantity:  10 tablet    Refills:  0            Where to get your medicines      These medications were sent to "[facility] [address] AT [facility] & [address] [place] [address]  [address] [place] [address] [place], [facility] [address]    Hours:  24-hours Phone:  [phone]     gabapentin 300 MG capsule    predniSONE 20 MG tablet                Primary Care Provider Office Phone # Fax #    Deqa Orlando Whitley [phone] [address]  [facility] [address]        Equal Access to Services     [facility]: Hadii aad ku hadasho Soomaali, waaxda luqadaha, qaybta kaalmada adeegyada, bri ayon . So [facility] [phone].    ATENCIÓN: Si habla español, tiene a livingston disposición servicios gratuitos de asistencia lingüística. [facility] [phone].    We comply with applicable federal civil rights laws and [place] laws. We do not discriminate on the basis of race, color, national origin, age, disability, sex, sexual orientation, or gender identity.            Thank you!     Thank you for choosing [facility]  for your care. Our goal is always to provide you with excellent care. Hearing back from our patients is one way we can continue to improve our services. Please take a few minutes to complete the written survey that you may receive in the mail after your visit with us. Thank you!             Your Updated Medication List - Protect others around you: Learn how to safely use, store and throw away your medicines at www.disposemymeds.org.          This list is accurate as of 7/19/18  9:21 AM.  Always use your most recent med list.                   Brand Name Dispense Instructions for use Diagnosis    cyclobenzaprine 10 MG tablet    FLEXERIL    30 tablet    Take 0.5-1 tablets (5-10 mg) by mouth nightly as needed for muscle spasms    Lesion of sciatic nerve, right       gabapentin 300 MG capsule    NEURONTIN    14 capsule    Take 1 capsule (300 mg) by mouth At  Bedtime    Lesion of right sciatic nerve       predniSONE 20 MG tablet    DELTASONE    10 tablet    Take 1 tablet (20 mg) by mouth 2 times daily    Lesion of right sciatic nerve

## 2018-07-20 ENCOUNTER — TELEPHONE (OUTPATIENT)
Dept: FAMILY MEDICINE | Facility: CLINIC | Age: 68
End: 2018-07-20

## 2018-08-10 DIAGNOSIS — G57.01 LESION OF RIGHT SCIATIC NERVE: ICD-10-CM

## 2018-08-10 NOTE — TELEPHONE ENCOUNTER
Medication Detail      Disp Refills Start End SHLOMO   gabapentin (NEURONTIN) 300 MG capsule 14 capsule 0 7/19/2018  --   Sig - Route: Take 1 capsule (300 mg) by mouth At Bedtime - Oral   Class: E-Prescribe   Order: [id number]   E-Prescribing Status: Receipt confirmed by pharmacy (7/19/2018  9:19 AM CDT)       Last Office Visit: 7/19/2018  Future Office visit:       Routing refill request to provider for review/approval because:  Drug not on the [facility] refill protocol or controlled substance    Medication Detail      Disp Refills Start End SHLOMO   predniSONE (DELTASONE) 20 MG tablet 10 tablet 0 7/19/2018  --   Sig - Route: Take 1 tablet (20 mg) by mouth 2 times daily - Oral   Class: E-Prescribe   Order: [id number]   E-Prescribing Status: Receipt confirmed by pharmacy (7/19/2018  9:19 AM CDT)       Last Office Visit: 7/19/2018  Future Office visit:       Routing refill request to provider for review/approval because:  Drug not on the [facility] refill protocol or controlled substance

## 2018-08-15 RX ORDER — GABAPENTIN 300 MG/1
CAPSULE ORAL
Qty: 14 CAPSULE | Refills: 0 | OUTPATIENT
Start: 2018-08-15

## 2018-08-15 RX ORDER — PREDNISONE 20 MG/1
TABLET ORAL
Qty: 10 TABLET | Refills: 0 | OUTPATIENT
Start: 2018-08-15

## 2018-08-15 NOTE — TELEPHONE ENCOUNTER
Pt was given one time script.   She would need to be seen for further refills.   She was supposed to go to ER.   DM

## 2018-08-16 NOTE — TELEPHONE ENCOUNTER
-  -Please call let her know she needs an appointment for further refills.     Thanks!  Nicolette Shipman RN, BSN

## 2019-01-04 ENCOUNTER — OFFICE VISIT (OUTPATIENT)
Dept: FAMILY MEDICINE | Facility: CLINIC | Age: 69
End: 2019-01-04
Payer: COMMERCIAL

## 2019-01-04 VITALS
SYSTOLIC BLOOD PRESSURE: 124 MMHG | HEART RATE: 87 BPM | WEIGHT: 194 LBS | RESPIRATION RATE: 18 BRPM | DIASTOLIC BLOOD PRESSURE: 76 MMHG | BODY MASS INDEX: 30.84 KG/M2 | OXYGEN SATURATION: 95 %

## 2019-01-04 DIAGNOSIS — L65.9 HAIR LOSS: ICD-10-CM

## 2019-01-04 DIAGNOSIS — M19.111 POST-TRAUMATIC OSTEOARTHRITIS OF RIGHT SHOULDER: ICD-10-CM

## 2019-01-04 DIAGNOSIS — F43.23 ADJUSTMENT DISORDER WITH MIXED ANXIETY AND DEPRESSED MOOD: Primary | ICD-10-CM

## 2019-01-04 LAB
BASOPHILS # BLD AUTO: 0.1 10E9/L (ref 0–0.2)
BASOPHILS NFR BLD AUTO: 0.6 %
DIFFERENTIAL METHOD BLD: NORMAL
EOSINOPHIL # BLD AUTO: 0.3 10E9/L (ref 0–0.7)
EOSINOPHIL NFR BLD AUTO: 3 %
ERYTHROCYTE [DISTWIDTH] IN BLOOD BY AUTOMATED COUNT: 12.7 % (ref 10–15)
HCT VFR BLD AUTO: 44.6 % (ref 35–47)
HGB BLD-MCNC: 14.9 G/DL (ref 11.7–15.7)
LYMPHOCYTES # BLD AUTO: 2.6 10E9/L (ref 0.8–5.3)
LYMPHOCYTES NFR BLD AUTO: 30 %
MCH RBC QN AUTO: 32.7 PG (ref 26.5–33)
MCHC RBC AUTO-ENTMCNC: 33.4 G/DL (ref 31.5–36.5)
MCV RBC AUTO: 98 FL (ref 78–100)
MONOCYTES # BLD AUTO: 0.8 10E9/L (ref 0–1.3)
MONOCYTES NFR BLD AUTO: 8.9 %
NEUTROPHILS # BLD AUTO: 5 10E9/L (ref 1.6–8.3)
NEUTROPHILS NFR BLD AUTO: 57.5 %
PLATELET # BLD AUTO: 374 10E9/L (ref 150–450)
RBC # BLD AUTO: 4.56 10E12/L (ref 3.8–5.2)
WBC # BLD AUTO: 8.7 10E9/L (ref 4–11)

## 2019-01-04 PROCEDURE — 85025 COMPLETE CBC W/AUTO DIFF WBC: CPT | Performed by: PHYSICIAN ASSISTANT

## 2019-01-04 PROCEDURE — 80053 COMPREHEN METABOLIC PANEL: CPT | Performed by: PHYSICIAN ASSISTANT

## 2019-01-04 PROCEDURE — 82728 ASSAY OF FERRITIN: CPT | Performed by: PHYSICIAN ASSISTANT

## 2019-01-04 PROCEDURE — 84443 ASSAY THYROID STIM HORMONE: CPT | Performed by: PHYSICIAN ASSISTANT

## 2019-01-04 PROCEDURE — 99214 OFFICE O/P EST MOD 30 MIN: CPT | Performed by: PHYSICIAN ASSISTANT

## 2019-01-04 PROCEDURE — 36415 COLL VENOUS BLD VENIPUNCTURE: CPT | Performed by: PHYSICIAN ASSISTANT

## 2019-01-04 RX ORDER — MIRTAZAPINE 7.5 MG/1
7.5-15 TABLET, FILM COATED ORAL AT BEDTIME
Qty: 60 TABLET | Refills: 1 | Status: SHIPPED | OUTPATIENT
Start: 2019-01-04 | End: 2019-05-22

## 2019-01-04 NOTE — PROGRESS NOTES
"  SUBJECTIVE:   Racquel Torres is a 68 year old female who presents to clinic today for the following health issues:    Abnormal Mood Symptoms      Duration: september    Description:  Depression: YES- pt daughter  in september  Anxiety: YES  Panic attacks: YES     Accompanying signs and symptoms: see PHQ-9 and MITCHELL scores    History (similar episodes/previous evaluation): None    Precipitating or alleviating factors: None    Therapies tried and outcome: none    Pt is also having problems lifting her arms      HPI additional notes:    Chief Complaint   Patient presents with     Depression     Racquel presents today with abnormal mood since daughter's death in 2018. Patient comes in at suggestion of her brother. Had court appearance regarding daughter's estate (daughter didn't have a will) and patient reports she was \"a wreck.\" Patient going through daughter's belongings, but having difficulty; feels overwhelmed, doesn't know where to begin, feels lost. Intermittently tearful, wakes in middle of the night crying, sleeps fitfully. Was given medication for similar sx after mother's death years ago, but didn't care for side effects (reports \"out of body\" and \"flat\" feeling, marked drowsiness).    Request note for light duty at work. Has hx chronic Rt shoulder pain s/p mechanical fall several years ago. Pain seems to be getting worse, causing hair to fall out. 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     ROS:    A Comprehensive greater than 10 system review of systems was carried out.    Pertinent positives and negatives are noted above.  Otherwise negative for contributory information.      Chart Review:  History   Smoking Status     Current Every Day Smoker     Packs/day: 0.50     Types: Cigarettes   Smokeless Tobacco     Never Used       Patient Active Problem List   Diagnosis     Rotator cuff injury     Injury, other and unspecified, shoulder and " upper arm     Lesion of sciatic nerve     Anxiety     History reviewed. No pertinent surgical history.  Problem list, Medication list, Allergies, Medical/Social/Surg hx reviewed in [facility], updated as appropriate.   OBJECTIVE:                                                    /76   Pulse 87   Resp 18   Wt 88 kg (194 lb)   SpO2 95%   BMI 30.84 kg/m    Body mass index is 30.84 kg/m .  GENERAL:  WDWN, no acute distress  PSYCH: Mental Status Exam  Behavior: cooperative and pleasant  Speech: normal rate and rhythm  Mood: labile  Affect: Tearful  Thought Processes: Rambling  Thought Content: no overt psychosis and denies suicidal ideation, intent or thoughts  Insight: Fair  Judgment: Adequate for safety  EYES: no discharge, no injection  HENT:  Normocephalic. Moist mucus membranes.  NECK:  Supple, symmetric  EXTREMITIES:  No gross deformities, moves all 4 limbs spontaneously, no peripheral edema  SKIN:  Warm and dry, no rash or suspicious lesions    NEUROLOGIC: alert, sensation grossly intact.  ORTHO: Rt Shoulder Exam: Inspection: no swelling, bruising, discoloration, or obvious deformity or asymmetry  Non-tender: throughout  Range of Motion  Passive: limited due to pain.    Diagnostic test results: none     ASSESSMENT/PLAN:                                                          ICD-10-CM    1. Adjustment disorder with mixed anxiety and depressed mood F43.23 mirtazapine (REMERON) 7.5 MG tablet   2. Hair loss L65.9 TSH with free T4 reflex     CBC with platelets differential     Comprehensive metabolic panel   3. Post-traumatic osteoarthritis of right shoulder M19.111      Reassured patient her mood sx are normal grief reaction. Offered referral to therapist, patient declines as she feels it wouldn't be helpful; encouraged her to reconsider and discussed benefits of therapy. Discussed pharmacologic management. Agree with avoid BZD given issues with SE in the past. Discussed use of Remeron at bedtime prn insomnia,  agitation. Reviewed anticipated clinical effects and potential SE.    Patient with post-traumatic OA of Rt shoulder, will hold on orthopedics referral due to patient request (too much on her plate right now). Note given regarding work restrictions; no heavy lifting and limited overhead work.    Discussed pain wouldn't cause hair loss; may be related to grief reaction. Will check screening labs to rule out underlying etiology.    Please see patient instructions for treatment details.    Follow up in 1 month for med check, sooner PRN.    Winifred Goddard PA-C  [facility]

## 2019-01-04 NOTE — LETTER
January 4, 2019      Racquel Torres  [address]  [place] [address]        To Whom It May Concern:    Racquel Torres was seen in our clinic. She may return to work with the following: limited to light duty - lifting no greater than 20 pounds and no use of arms over shoulders on or about 1/4/2019.      Sincerely,        Winifred Goddard PA-C

## 2019-01-04 NOTE — LETTER
January 7, 2019      Racquel Torres  [address]  [place] [address]        Dear Racquel,    We are writing to inform you of your test results.    - Your metabolic panel shows:  normal electrolytes (sodium, potassium, calcium) normal kidney function (creatinine and GFR) normal liver function (AST/ALT)  - Your iron level (ferritin) is normal.  - Your TSH, a screening test for thyroid disease, was normal.  - Your Blood Count Results show normal White Blood Cell count (no sign of infection), normal Hemoglobin (not anemia), and normal Platelets (affects clotting).    Results for orders placed or performed in visit on 01/04/19   TSH with free T4 reflex   Result Value Ref Range    TSH 1.40 0.40 - 4.00 mU/L   CBC with platelets differential   Result Value Ref Range    WBC 8.7 4.0 - 11.0 10e9/L    RBC Count 4.56 3.8 - 5.2 10e12/L    Hemoglobin 14.9 11.7 - 15.7 g/dL    Hematocrit 44.6 35.0 - 47.0 %    MCV 98 78 - 100 fl    MCH 32.7 26.5 - 33.0 pg    MCHC 33.4 31.5 - 36.5 g/dL    RDW 12.7 10.0 - 15.0 %    Platelet Count 374 150 - 450 10e9/L    % Neutrophils 57.5 %    % Lymphocytes 30.0 %    % Monocytes 8.9 %    % Eosinophils 3.0 %    % Basophils 0.6 %    Absolute Neutrophil 5.0 1.6 - 8.3 10e9/L    Absolute Lymphocytes 2.6 0.8 - 5.3 10e9/L    Absolute Monocytes 0.8 0.0 - 1.3 10e9/L    Absolute Eosinophils 0.3 0.0 - 0.7 10e9/L    Absolute Basophils 0.1 0.0 - 0.2 10e9/L    Diff Method Automated Method    Comprehensive metabolic panel   Result Value Ref Range    Sodium 139 133 - 144 mmol/L    Potassium 3.9 3.4 - 5.3 mmol/L    Chloride 107 94 - 109 mmol/L    Carbon Dioxide 25 20 - 32 mmol/L    Anion Gap 7 3 - 14 mmol/L    Glucose 76 70 - 99 mg/dL    Urea Nitrogen 8 7 - 30 mg/dL    Creatinine 0.72 0.52 - 1.04 mg/dL    GFR Estimate 87 >60 mL/min/[1.73_m2]    GFR Estimate If Black >90 >60 mL/min/[1.73_m2]    Calcium 9.0 8.5 - 10.1 mg/dL    Bilirubin Total 0.3 0.2 - 1.3 mg/dL    Albumin 3.6 3.4 - 5.0 g/dL    Protein Total  7.3 6.8 - 8.8 g/dL    Alkaline Phosphatase 81 40 - 150 U/L    ALT 15 0 - 50 U/L    AST 19 0 - 45 U/L   Ferritin   Result Value Ref Range    Ferritin 82 8 - 252 ng/mL       Thank you for choosing [facility].  We appreciate the opportunity to serve you and look forward to supporting your healthcare needs in the future.    If you have any questions or concerns, please call me or my staff at [phone].      Sincerely,        Winifred Goddard PA-C

## 2019-01-05 LAB
ALBUMIN SERPL-MCNC: 3.6 G/DL (ref 3.4–5)
ALP SERPL-CCNC: 81 U/L (ref 40–150)
ALT SERPL W P-5'-P-CCNC: 15 U/L (ref 0–50)
ANION GAP SERPL CALCULATED.3IONS-SCNC: 7 MMOL/L (ref 3–14)
AST SERPL W P-5'-P-CCNC: 19 U/L (ref 0–45)
BILIRUB SERPL-MCNC: 0.3 MG/DL (ref 0.2–1.3)
BUN SERPL-MCNC: 8 MG/DL (ref 7–30)
CALCIUM SERPL-MCNC: 9 MG/DL (ref 8.5–10.1)
CHLORIDE SERPL-SCNC: 107 MMOL/L (ref 94–109)
CO2 SERPL-SCNC: 25 MMOL/L (ref 20–32)
CREAT SERPL-MCNC: 0.72 MG/DL (ref 0.52–1.04)
FERRITIN SERPL-MCNC: 82 NG/ML (ref 8–252)
GFR SERPL CREATININE-BSD FRML MDRD: 87 ML/MIN/{1.73_M2}
GLUCOSE SERPL-MCNC: 76 MG/DL (ref 70–99)
POTASSIUM SERPL-SCNC: 3.9 MMOL/L (ref 3.4–5.3)
PROT SERPL-MCNC: 7.3 G/DL (ref 6.8–8.8)
SODIUM SERPL-SCNC: 139 MMOL/L (ref 133–144)
TSH SERPL DL<=0.005 MIU/L-ACNC: 1.4 MU/L (ref 0.4–4)

## 2019-01-09 ENCOUNTER — TELEPHONE (OUTPATIENT)
Dept: FAMILY MEDICINE | Facility: CLINIC | Age: 69
End: 2019-01-09

## 2019-01-09 NOTE — TELEPHONE ENCOUNTER
Please advice if ok to take Remeron and gabapentin for sciatica pain. No interaction found under micromedex.

## 2019-01-09 NOTE — TELEPHONE ENCOUNTER
Reason for Call:  Other call back    Detailed comments: question-can she take gabapentin with the meds she was given [facility] has some left from the issue before    Phone Number Patient can be reached at: Home number on file [phone] (home)    Best Time:     Can we leave a detailed message on this number? YES    Call taken on 1/9/2019 at 2:55 PM by EARNEST RODRIGUEZ

## 2019-02-04 ENCOUNTER — VIRTUAL VISIT (OUTPATIENT)
Dept: FAMILY MEDICINE | Facility: CLINIC | Age: 69
End: 2019-02-04
Payer: COMMERCIAL

## 2019-02-04 DIAGNOSIS — F43.23 ADJUSTMENT DISORDER WITH MIXED ANXIETY AND DEPRESSED MOOD: Primary | ICD-10-CM

## 2019-02-04 DIAGNOSIS — G57.01 LESION OF RIGHT SCIATIC NERVE: ICD-10-CM

## 2019-02-04 PROCEDURE — 99442 ZZC PHYSICIAN TELEPHONE EVALUATION 11-20 MIN: CPT | Performed by: PHYSICIAN ASSISTANT

## 2019-02-04 NOTE — PROGRESS NOTES
"Racquel Torres is a 68 year old female who is being evaluated via a billable telephone visit.      The patient has been notified of following:     \"This telephone visit will be conducted via a call between you and your physician/provider. We have found that certain health care needs can be provided without the need for a physical exam.  This service lets us provide the care you need with a short phone conversation.  If a prescription is necessary we can send it directly to your pharmacy.  If lab work is needed we can place an order for that and you can then stop by our lab to have the test done at a later time.    If during the course of the call the physician/provider feels a telephone visit is not appropriate, you will not be charged for this service.\"     Consent has been obtained for this service by 1 care team member: yes. See the scanned image in the medical record.    Racquel Torres complains of    Chief Complaint   Patient presents with     Depression     follow up     Telephone       I have reviewed and updated the patient's Past Medical History, Social History, Family History and Medication List.    ALLERGIES  Patient has no known allergies.    AUDELIA Zhu CMA   (MA signature)    Additional provider notes:   Depression and Anxiety Follow-Up    Status since last visit: unclear    Other associated symptoms:None    Complicating factors:     Significant life event: Yes-  Daughter's death in Sept 2018     Current substance abuse: None    No flowsheet data found.  No flowsheet data found.  In the past two weeks have you had thoughts of suicide or self-harm?  No.    Do you have concerns about your personal safety or the safety of others?   No  PHQ-9  English  PHQ-9   Any Language  MITCHELL-7  Suicide Assessment Five-step Evaluation and Treatment (SAFE-T)    F/u for adjustment disorder. Started on Remeron 7.5 mg at bedtime during last visit. Patient poor historian. Notes increase daytime somnolence with use of Remeron, " "unclear if this is improving with continued use. Patient continues to have crying spells intermittently, but again unclear if this has improved since last visit, states \"I thought this was supposed to make those feelings go away.\" Mentions sciatic pain continues to flare.    Assessment/Plan:  (F43.23) Adjustment disorder with mixed anxiety and depressed mood  (primary encounter diagnosis)  Difficult assessment today. Patient frequently went on tangents regarding sciatic nerve pain, confounded gabapentin with mirtazapine frequently. Unclear as to intended effects of mirtazapine and gives tangential answers when asked about efficacy. Reviewed no medication will take her grief away, but goal was to help \"even out\" her mood swings and help with daily function a bit more. Discussed switching medications given sedating effects with mirtazipine, but ultimately patient states she will try to take it 2 hours prior to bedtime to see if this helps with morning drowsiness. Unclear if disordered thinking related to mood disorder vs cognitive issue. Recommend f/u within 1 month, NOT appropriate for further telephone visits, OV in person preferred.    (G57.01) Lesion of right sciatic nerve  Patient got most relief from prednisone burst when sx started, but again reviewed not indicated for long-term use due to side effects. Patient tried gabapentin, but just made her drowsy. Offered referral to med spine specialist (may benefit from ROM, PT, etc) but patient declines.    I have reviewed the note as documented above.  This accurately captures the substance of my conversation with the patient.  Total time of call between patient and provider was 12 minutes     Winifred Goddard PA-C  "

## 2019-02-26 ENCOUNTER — TELEPHONE (OUTPATIENT)
Dept: FAMILY MEDICINE | Facility: CLINIC | Age: 69
End: 2019-02-26

## 2019-02-26 NOTE — TELEPHONE ENCOUNTER
Reason for Call:  Other call back    Detailed comments: Patient is having body aches and nausea but no vomiting and wants to know if she can take a cold/flu medication while she is on mirtazapine (REMERON) 7.5 MG tablet    Phone Number Patient can be reached at: Home number on file [phone] (home)    Best Time: anytime     Can we leave a detailed message on this number? YES    Call taken on 2/26/2019 at 11:01 AM by Dulce Maria Wolfe

## 2019-02-27 NOTE — TELEPHONE ENCOUNTER
"Ok to use OTC cold/cough medications when taking Remeron. Just be aware that any \"nighttime\" formula (ex. Nyquil) may make you extra sleepy when taking with Remeron.  "

## 2019-02-27 NOTE — TELEPHONE ENCOUNTER
Pt was called with providers message below. Reports she took francisco ramandeep plus and notes symptom improvement today. She complains a body aches. Advised pt schedule OV if persistent, worsening or new symptoms unrelieved  with OTC pain medications. Pt verbalized agreement with plan.

## 2019-03-12 ENCOUNTER — TELEPHONE (OUTPATIENT)
Dept: FAMILY MEDICINE | Facility: CLINIC | Age: 69
End: 2019-03-12

## 2019-03-12 NOTE — TELEPHONE ENCOUNTER
Patient would like to speak to a nurse about some of the side effects from her medication mirtazapine (REMERON) 7.5 MG tablet.

## 2019-03-13 ASSESSMENT — PATIENT HEALTH QUESTIONNAIRE - PHQ9: SUM OF ALL RESPONSES TO PHQ QUESTIONS 1-9: 17

## 2019-03-13 NOTE — TELEPHONE ENCOUNTER
Ok to stop medication, shouldn't have any ill effects from sudden cessation at low dose. If she would like to discuss other options, she may schedule an OV.

## 2019-03-13 NOTE — TELEPHONE ENCOUNTER
"Pt would like to know if she can just stop taking mirtazapine since this is making her tired. \"I have no energy\". Pt asked if she needs to titrate of can just stop medications. Pt currently takes 1 tablet daily. PHQ9  Was completed today =17    PHQ-9 SCORE 3/13/2019   PHQ-9 Total Score 17      Advised pt to schedule OV to discuss alternative. Please advice . Ok to stop medication?   "

## 2019-05-22 ENCOUNTER — OFFICE VISIT (OUTPATIENT)
Dept: FAMILY MEDICINE | Facility: CLINIC | Age: 69
End: 2019-05-22
Payer: COMMERCIAL

## 2019-05-22 ENCOUNTER — TELEPHONE (OUTPATIENT)
Dept: FAMILY MEDICINE | Facility: CLINIC | Age: 69
End: 2019-05-22

## 2019-05-22 VITALS
OXYGEN SATURATION: 94 % | WEIGHT: 199.5 LBS | DIASTOLIC BLOOD PRESSURE: 72 MMHG | HEART RATE: 76 BPM | SYSTOLIC BLOOD PRESSURE: 120 MMHG | TEMPERATURE: 98.6 F | BODY MASS INDEX: 31.72 KG/M2

## 2019-05-22 DIAGNOSIS — M76.31 IT BAND SYNDROME, RIGHT: Primary | ICD-10-CM

## 2019-05-22 DIAGNOSIS — S76.911A MUSCLE STRAIN OF RIGHT THIGH, INITIAL ENCOUNTER: ICD-10-CM

## 2019-05-22 DIAGNOSIS — F43.23 ADJUSTMENT DISORDER WITH MIXED ANXIETY AND DEPRESSED MOOD: ICD-10-CM

## 2019-05-22 PROCEDURE — 99214 OFFICE O/P EST MOD 30 MIN: CPT | Performed by: PHYSICIAN ASSISTANT

## 2019-05-22 RX ORDER — METHYLPREDNISOLONE 4 MG
TABLET, DOSE PACK ORAL
Qty: 21 TABLET | Refills: 0 | Status: SHIPPED | OUTPATIENT
Start: 2019-05-22 | End: 2019-07-15

## 2019-05-22 RX ORDER — MIRTAZAPINE 7.5 MG/1
7.5 TABLET, FILM COATED ORAL
Start: 2019-05-22 | End: 2019-07-15

## 2019-05-22 NOTE — TELEPHONE ENCOUNTER
Reason for Call:  Other call back    Detailed comments: Patient is wondering if she can still take this medication as Winifred told her she could but then discontinued it. Patient states she still had a refill at the pharmacy. Please call to verify. Patient saw Winifred this afternoon    Phone Number Patient can be reached at: Home number on file [phone] (home)    Best Time: any. Patient is usually home after 1-2 pm    Can we leave a detailed message on this number? YES    Call taken on 5/22/2019 at 4:03 PM by SOFÍA QURESHI

## 2019-05-22 NOTE — TELEPHONE ENCOUNTER
Remeron was discontinued as patient listed as not taking. Brought up at end of visit, so was not corrected. But, yes, patient may continue taking it.

## 2019-05-22 NOTE — PROGRESS NOTES
Subjective     Racquel Torres is a 69 year old female who presents to clinic today for the following health issues:    HPI   Back Pain       Duration: 2 days        Specific cause: lifting, turning/bending, not sure if related    Description:   Location of pain: right thigh into calf  Character of pain: sharp  Pain radiation:radiates into the right leg  New numbness or weakness in legs, not attributed to pain:  no     Intensity: Currently 9/10    History:   Pain interferes with job: YES  History of back problems: no prior back problems  Any previous MRI or X-rays: None  Sees a specialist for back pain:  No  Therapies tried without relief: heat and massage    Alleviating factors:   Improved by: heat and massage      Precipitating factors:  Worsened by: Walking        Accompanying Signs & Symptoms:  Risk of Fracture:  None  Risk of Cauda Equina:  None  Risk of Infection:  None  Risk of Cancer:  None  Risk of Ankylosing Spondylitis:  Onset at age <35, male, AND morning back stiffness. no       Depression Followup    How are you doing with your depression since your last visit? No change    Are you having other symptoms that might be associated with depression? No    Have you had a significant life event?  Grief or Loss     Are you feeling anxious or having panic attacks?   No    Do you have any concerns with your use of alcohol or other drugs? No    Social History     Tobacco Use     Smoking status: Current Every Day Smoker     Packs/day: 0.50     Types: Cigarettes     Smokeless tobacco: Never Used   Substance Use Topics     Alcohol use: Yes     Comment: occ.     Drug use: No     PHQ 3/13/2019   PHQ-9 Total Score 17   Q9: Thoughts of better off dead/self-harm past 2 weeks Not at all     Suicide Assessment Five-step Evaluation and Treatment (SAFE-T)    Patient Active Problem List   Diagnosis     Rotator cuff injury     Injury, other and unspecified, shoulder and upper arm     Lesion of sciatic nerve     Adjustment  disorder with mixed anxiety and depressed mood     History reviewed. No pertinent surgical history.    Social History     Tobacco Use     Smoking status: Current Every Day Smoker     Packs/day: 0.50     Types: Cigarettes     Smokeless tobacco: Never Used   Substance Use Topics     Alcohol use: Yes     Comment: occ.     Family History   Problem Relation Age of Onset     Diabetes Father          Current Outpatient Medications   Medication Sig Dispense Refill     methylPREDNISolone (MEDROL DOSEPAK) 4 MG tablet therapy pack Follow Package Directions 21 tablet 0     tiZANidine (ZANAFLEX) 4 MG tablet Take 1 tablet (4 mg) by mouth 3 times daily as needed for muscle spasms 10 tablet 0     mirtazapine (REMERON) 7.5 MG tablet Take 1 tablet (7.5 mg) by mouth nightly as needed (anxiety, insomnia)         Reviewed and updated as needed this visit by Provider         Review of Systems   ROS COMP: Constitutional, HEENT, cardiovascular, pulmonary, GI, , musculoskeletal, neuro, skin, endocrine and psych systems are negative, except as otherwise noted.      Objective    /72 (Cuff Size: Adult Large)   Pulse 76   Temp 98.6  F (37  C) (Tympanic)   Wt 90.5 kg (199 lb 8 oz)   SpO2 94%   BMI 31.72 kg/m    Body mass index is 31.72 kg/m .  Physical Exam   GENERAL: WDWN, no acute distress  PSYCH: Mental Status Exam  Behavior: cooperative and pleasant  Speech: normal rate and rhythm  Mood: labile  Affect: Appropriate/mood-congruent, tearful at times  Thought Processes: Logical and Linear  Thought Content: denies suicidal ideation, intent or thoughts  Insight: Fair  Judgment: Adequate for safety  EYES: no discharge, no injection  HENT:  Normocephalic. Moist mucus membranes.  NECK:  Supple, symmetric  LUNGS:  Clear to auscultation bilaterally without rhonchi, rales, or wheeze. Chest rise symmetric and no tenderness to palpation.  HEART:  Regular rate & rhythm. No murmur, gallop, or rub.  BACK: no spinal or CVA tenderness, no SIJ  "pain. No muscular tenderness.  SKIN:  Warm and dry, no rash or suspicious lesions    NEUROLOGIC: alert, sensation grossly intact.  Right Knee Exam     Tenderness   Right knee tenderness location: distal IT band.    Range of Motion   The patient has normal right knee ROM.    Other   Erythema: absent  Sensation: normal  Swelling: none      Left Knee Exam   Left knee exam is normal.      Right Hip Exam     Tenderness   Right hip tenderness location: proximal IT band.    Range of Motion   The patient has normal right hip ROM.    Muscle Strength   The patient has normal right hip strength.    Tests   ALBERTA: negative  Bimal: negative    Other   Sensation: normal      Left Hip Exam   Left hip exam is normal.            Diagnostic Test Results:  none         Assessment & Plan       ICD-10-CM    1. It band syndrome, right M76.31 methylPREDNISolone (MEDROL DOSEPAK) 4 MG tablet therapy pack   2. Muscle strain of right thigh, initial encounter S76.911A tiZANidine (ZANAFLEX) 4 MG tablet   3. Adjustment disorder with mixed anxiety and depressed mood F43.23      - IT band syndrome and associated muscle tension of hamstrings. Take Medrol dosepak to help with inflammation. Tizanidine prn muscle spasms. Gentle ROM stretches. Heat/ice prn.    - Patient continues to have labile mood 2/2 death of daughter. Was quite sedated with Remeron, ok to decrease by half. Highly encouraged patient to seek therapy, but she declines (\"I'm just not ready\").    F/u in 1 week if not improved or worsening, sooner prn.      Winifred Goddard PA-C  [facility]        "

## 2019-06-14 ENCOUNTER — TELEPHONE (OUTPATIENT)
Dept: SCHEDULING | Facility: CLINIC | Age: 69
End: 2019-06-14

## 2019-06-18 ENCOUNTER — ANCILLARY PROCEDURE (OUTPATIENT)
Dept: MAMMOGRAPHY | Facility: CLINIC | Age: 69
End: 2019-06-18
Payer: COMMERCIAL

## 2019-06-18 DIAGNOSIS — Z12.31 VISIT FOR SCREENING MAMMOGRAM: ICD-10-CM

## 2019-06-18 PROCEDURE — 77067 SCR MAMMO BI INCL CAD: CPT | Mod: TC

## 2019-07-13 ENCOUNTER — NURSE TRIAGE (OUTPATIENT)
Dept: NURSING | Facility: CLINIC | Age: 69
End: 2019-07-13

## 2019-07-13 NOTE — TELEPHONE ENCOUNTER
Pt calls in with concern of skin peeling off of both her hands   Noticed this started approx 2 weeks ago    No Fever  No streaking -purple dots    No drainage  Not painful - doesn't itch     Pt says this happened to her in past - thought it was from the gloves she was wearing at work > that time resolved on own - but is now not working     No new detergent - med's - or  food     Pt advised she could go to Urgent Care or ER today     Pt would rather make appointment with Primary for evaluation     Pt transferred to scheduling > appointment made for this Monday at 0810    Protocol and care advice reviewed  Caller states understanding of the recommended disposition    Advised to call back if further questions or concerns    Pete Valerio , RN / [facility]    Reason for Disposition    Localized rash present > 7 days    Additional Information    Negative: [1] Sudden onset of rash (within last 2 hours) AND [2] difficulty with breathing or swallowing    Negative: Sounds like a life-threatening emergency to the triager    Negative: Patient sounds very sick or weak to the triager    Negative: [1] Localized purple or blood-colored spots or dots AND [2] not from injury or friction AND [3] fever    Negative: [1] Red area or streak AND [2] fever    Negative: [1] Rash is painful to touch AND [2] fever    Negative: [1] Looks infected (spreading redness, pus) AND [2] large red area (> 2 in. or 5 cm)    Negative: [1] Looks infected (spreading redness, pus) AND [2] diabetes mellitus or weak immune system (e.g., HIV positive,  cancer chemotherapy, chronic steroid treatment, splenectomy)    Negative: [1] Localized purple or blood-colored spots or dots AND [2] not from injury or friction AND [3] no fever    Negative: [1] Looks infected (spreading redness, pus) AND [2] no fever    Negative: Looks like a boil, infected sore, deep ulcer or other infected rash    Negative: [1] Localized rash is very painful AND [2] no  fever    Negative: Genital area rash    Negative: Lyme disease suspected (e.g., bull's eye rash or tick bite / exposure)    Negative: [1] Applying cream or ointment AND [2] causes severe itch, burning or pain    Negative: [1] Pimples (localized) AND [2 ] no improvement after using CARE ADVICE    Negative: Tender bumps in armpits    Negative: [1] Severe localized itching AND [2] after 2 days of steroid cream    Protocols used: RASH OR REDNESS - [id number]

## 2019-07-15 ENCOUNTER — OFFICE VISIT (OUTPATIENT)
Dept: FAMILY MEDICINE | Facility: CLINIC | Age: 69
End: 2019-07-15
Payer: COMMERCIAL

## 2019-07-15 VITALS
SYSTOLIC BLOOD PRESSURE: 120 MMHG | WEIGHT: 197 LBS | BODY MASS INDEX: 31.32 KG/M2 | OXYGEN SATURATION: 95 % | TEMPERATURE: 97.9 F | HEART RATE: 76 BPM | DIASTOLIC BLOOD PRESSURE: 83 MMHG

## 2019-07-15 DIAGNOSIS — L85.3 DRY SKIN: Primary | ICD-10-CM

## 2019-07-15 DIAGNOSIS — L30.9 DERMATITIS: ICD-10-CM

## 2019-07-15 PROCEDURE — 99213 OFFICE O/P EST LOW 20 MIN: CPT | Performed by: PHYSICIAN ASSISTANT

## 2019-07-15 RX ORDER — BETAMETHASONE VALERATE 0.1 %
LOTION (ML) TOPICAL 2 TIMES DAILY
Qty: 60 ML | Refills: 0 | Status: SHIPPED | OUTPATIENT
Start: 2019-07-15

## 2019-07-15 NOTE — PROGRESS NOTES
Subjective     Racquel Torres is a 69 year old female who presents to clinic today for the following health issues:    HPI   Peeling hands      Duration: 2weeks    Description (location/character/radiation): hands    Intensity:  moderate, severe    Accompanying signs and symptoms: skin peeling    History (similar episodes/previous evaluation): None    Precipitating or alleviating factors: used scrubbing bubbles recently    Therapies tried and outcome: different lotions       Patient Active Problem List   Diagnosis     Rotator cuff injury     Injury, other and unspecified, shoulder and upper arm     Lesion of sciatic nerve     Adjustment disorder with mixed anxiety and depressed mood     History reviewed. No pertinent surgical history.    Social History     Tobacco Use     Smoking status: Current Every Day Smoker     Packs/day: 0.50     Types: Cigarettes     Smokeless tobacco: Never Used   Substance Use Topics     Alcohol use: Yes     Comment: occ.     Family History   Problem Relation Age of Onset     Diabetes Father          Current Outpatient Medications   Medication Sig Dispense Refill     betamethasone valerate (VALISONE) 0.1 % external lotion Apply topically 2 times daily 60 mL 0         Reviewed and updated as needed this visit by Provider  Tobacco  Allergies  Meds  Problems  Med Hx  Surg Hx  Fam Hx         Review of Systems   ROS COMP: Constitutional, HEENT, cardiovascular, pulmonary, GI, , musculoskeletal, neuro, skin, endocrine and psych systems are negative, except as otherwise noted.      Objective    /83   Pulse 76   Temp 97.9  F (36.6  C) (Tympanic)   Wt 89.4 kg (197 lb)   SpO2 95%   BMI 31.32 kg/m    Body mass index is 31.32 kg/m .  Physical Exam   GENERAL:  WDWN, no acute distress  PSYCH: pleasant, cooperative  EYES: no discharge, no injection  HENT:  Normocephalic. Moist mucus membranes.  NECK:  Supple, symmetric  EXTREMITIES:  No gross deformities, moves all 4 limbs  spontaneously  SKIN: Bilat hands with large patches of peeling skin; no erythema, xerosis, open sores or lesions.   NEUROLOGIC: alert, sensation grossly intact.    Diagnostic Test Results:  none         Assessment & Plan       ICD-10-CM    1. Dry skin L85.3 betamethasone valerate (VALISONE) 0.1 % external lotion   2. Dermatitis L30.9 betamethasone valerate (VALISONE) 0.1 % external lotion     - Patient with peeling, dry skin; no evidence of tight, xerotic skin concerning for underlying autoimmune or severe allergic dermatitis rxn.  - No infectious stigmata  - Apply Valisone lotion BID, followed by OTC moisturizer (ex. Vaseline Intensive Care, Cerave)  - Avoid use of harsh  and soaps, wear gloves when cleaning or doing dishes.      Return in about 1 month (around 8/15/2019), or if symptoms worsen or fail to improve.    Winifred Goddard PA-C  [facility]

## 2020-04-27 ENCOUNTER — VIRTUAL VISIT (OUTPATIENT)
Dept: FAMILY MEDICINE | Facility: CLINIC | Age: 70
End: 2020-04-27
Payer: COMMERCIAL

## 2020-04-27 DIAGNOSIS — F32.89 OTHER DEPRESSION: Primary | ICD-10-CM

## 2020-04-27 DIAGNOSIS — G47.00 INSOMNIA, UNSPECIFIED TYPE: ICD-10-CM

## 2020-04-27 PROCEDURE — 99214 OFFICE O/P EST MOD 30 MIN: CPT | Mod: 95 | Performed by: FAMILY MEDICINE

## 2020-04-27 ASSESSMENT — PATIENT HEALTH QUESTIONNAIRE - PHQ9: SUM OF ALL RESPONSES TO PHQ QUESTIONS 1-9: 16

## 2020-04-27 NOTE — PROGRESS NOTES
"Racquel Torres is a 70 year old female who is being evaluated via a billable telephone visit.      The patient has been notified of following:     \"This telephone visit will be conducted via a call between you and your physician/provider. We have found that certain health care needs can be provided without the need for a physical exam.  This service lets us provide the care you need with a short phone conversation.  If a prescription is necessary we can send it directly to your pharmacy.  If lab work is needed we can place an order for that and you can then stop by our lab to have the test done at a later time.    Telephone visits are billed at different rates depending on your insurance coverage. During this emergency period, for some insurers they may be billed the same as an in-person visit.  Please reach out to your insurance provider with any questions.    If during the course of the call the physician/provider feels a telephone visit is not appropriate, you will not be charged for this service.\"    Patient has given verbal consent for Telephone visit?  Yes    How would you like to obtain your AVS? Mail a copy    Subjective     Racquel Torres is a 70 year old female who presents to clinic today for the following health issues:    HPI  Abnormal Mood Symptoms      Duration: ongoing for about a year     Description:  Depression: YES  Anxiety: no  Panic attacks: no     Accompanying signs and symptoms: see PHQ-9 and MITCHELL scores    History (similar episodes/previous evaluation): None    Precipitating or alleviating factors: None    Therapies tried and outcome: none      Pt would like to start medication.  Hesitant to start medication but thinks it will be helpful and friends/family have recommended this.    Reviewed and updated as needed this visit by Provider  Tobacco  Allergies  Meds  Problems  Med Hx  Surg Hx  Fam Hx         Review of Systems   ROS COMP: CONSTITUTIONAL: NEGATIVE for fever, chills, change " in weight  INTEGUMENTARY/SKIN: NEGATIVE for worrisome rashes, moles or lesions  EYES: NEGATIVE for vision changes or irritation  ENT/MOUTH: NEGATIVE for ear, mouth and throat problems  RESP: NEGATIVE for significant cough or SOB  CV: NEGATIVE for chest pain, palpitations or peripheral edema  GI: NEGATIVE for nausea, abdominal pain, heartburn, or change in bowel habits  : NEGATIVE for frequency, dysuria, or hematuria  MUSCULOSKELETAL: NEGATIVE for significant arthralgias or myalgia  NEURO: NEGATIVE for weakness, dizziness or paresthesias       Objective   Reported vitals:  There were no vitals taken for this visit.   healthy, alert and no distress  PSYCH: Alert and oriented times 3; coherent speech, normal   rate and volume, able to articulate logical thoughts, able   to abstract reason, no tangential thoughts, no hallucinations   or delusions  Her affect is anxious  RESP: No cough, no audible wheezing, able to talk in full sentences  Remainder of exam unable to be completed due to telephone visits    Diagnostic Test Results:  Labs reviewed in Epic    Reviewed    Assessment/Plan:  1. Other depression  2. Insomnia, unspecified type  - Melatonin 1 MG SUBL; Place 1 mg under the tongue At Bedtime Take 30 mins before bedtime  Dispense: 30 tablet; Refill: 3    Initially we discussed starting Zoloft but was having GI symptoms.  So we switched to Lexapro (see recent phone note from 4/28/2020).  Will start Lexapro at 2.5 mg daily x 1 week, then increase to 5 mg daily if tolerated  Discussed need for gradual increase of SSRI dose over time, titrating to effect.  Reviewed potential for initial side effects (such as headache, GI symptoms, and dry mouth) that will likely subside after a week or so, but that therapeutic effects will likely take 1-2 weeks - so it's important to stick with medication for at least a month to adequately gauge effect.  Notify me of any significant side effects.  Discussed that treatment with SSRI  medications requires a minimum commitment of 9-12 months; shorter courses are associated with rebound symptoms.  Discussed potential long-term side effects including sexual side effects.     Melatonin for insomnia.      Return in about 6 weeks (around 6/8/2020) for re-check / follow-up.      Phone call duration:  26 minutes    Cortney Acevedo DO

## 2020-04-28 ENCOUNTER — NURSE TRIAGE (OUTPATIENT)
Dept: FAMILY MEDICINE | Facility: CLINIC | Age: 70
End: 2020-04-28

## 2020-04-28 DIAGNOSIS — F32.89 OTHER DEPRESSION: Primary | ICD-10-CM

## 2020-04-28 RX ORDER — ESCITALOPRAM OXALATE 5 MG/1
5 TABLET ORAL DAILY
Qty: 30 TABLET | Refills: 2 | Status: SHIPPED | OUTPATIENT
Start: 2020-04-28

## 2020-04-28 NOTE — TELEPHONE ENCOUNTER
Called patient. She states she has been taking 1/2 tab (25 mg) sertraline. If switching to lexapro, she requests it be sent to [facility].    Routing to provider to advise.

## 2020-04-28 NOTE — TELEPHONE ENCOUNTER
Reason for Call:  Other call back    Detailed comments: The patient called and stated that she had a phone visit with Dr. Acevedo on 4/27 and was prescribed sertraline (ZOLOFT) 50 MG tablet. She started taking it today and has been having diarrhea. She is wondering if this could be a side effect of the medication and has questions about if she should continue taking it. She would like a call back to discuss this.        Phone Number Patient can be reached at: Cell number on file:    Telephone Information:   Mobile [phone]       Best Time: Any    Can we leave a detailed message on this number? YES    Call taken on 4/28/2020 at 1:23 PM by Bryanna Nixon

## 2020-04-28 NOTE — TELEPHONE ENCOUNTER
Patient calling to report diarrhea after starting sertraline today. She states it occurred about 30-45 minutes after she started the medication. Had ihsan carty yesterday, but does not think it is related.     States she has gone to the bathroom 5 times since starting rx. Requesting provider review and advise if alternative is needed.    Additional Information    Negative: Shock suspected (e.g., cold/pale/clammy skin, too weak to stand, low BP, rapid pulse)    Negative: Difficult to awaken or acting confused (e.g., disoriented, slurred speech)    Negative: Sounds like a life-threatening emergency to the triager    Negative: Vomiting also present and worse than the diarrhea    Negative: Blood in stool and without diarrhea    Negative: SEVERE abdominal pain (e.g., excruciating) and present > 1 hour    Negative: SEVERE abdominal pain and age > 60    Negative: Bloody, black, or tarry bowel movements    Negative: SEVERE diarrhea (e.g., 7 or more times / day more than normal) and age > 60 years    Negative: Constant abdominal pain lasting > 2 hours    Negative: Drinking very little and has signs of dehydration (e.g., no urine > 12 hours, very dry mouth, very lightheaded)    Negative: Patient sounds very sick or weak to the triager    Negative: SEVERE diarrhea (e.g., 7 or more times / day more than normal) and present > 24 hours (1 day)    Negative: MODERATE diarrhea (e.g., 4-6 times / day more than normal) and present > 48 hours (2 days)    Negative: MODERATE diarrhea (e.g., 4-6 times / day more than normal) and age > 70 years    Negative: Abdominal pain  (Exception: pain clears completely with each passage of diarrhea stool)    Negative: Fever > 101 F (38.3 C)    Negative: Blood in the stool    Negative: Mucus or pus in stool has been present > 2 days and diarrhea is more than mild    Negative: Weak immune system (e.g., HIV positive, cancer chemo, splenectomy, organ transplant, chronic steroids)    Negative: Travel to a  "foreign country in past month    Negative: Recent antibiotic therapy (i.e., within last 2 months) and diarrhea present > 3 days since antibiotic was stopped    Negative: Recent hospitalization and diarrhea present > 3 days    Negative: Tube feedings (e.g., nasogastric, g-tube, j-tube)    Negative: MILD diarrhea (e.g., 1-3 or more stools than normal in past 24 hours) diarrhea without known cause and present > 7 days    Negative: Patient wants to be seen    Negative: MILD diarrhea and taking antibiotics    Negative: Diarrhea is a chronic symptom (recurrent or ongoing AND lasting > 4 weeks)    Negative: SEVERE diarrhea (e.g., 7 or more times / day more than normal)    MILD-MODERATE diarrhea (e.g., 1-6 times / day more than normal)    Answer Assessment - Initial Assessment Questions  1. DIARRHEA SEVERITY: \"How bad is the diarrhea?\" \"How many extra stools have you had in the past 24 hours than normal?\"     - MILD: Few loose or mushy BMs; increase of 1-3 stools over normal daily number of stools; mild increase in ostomy output.    - MODERATE: Increase of 4-6 stools daily over normal; moderate increase in ostomy output.    - SEVERE (or Worst Possible): Increase of 7 or more stools daily over normal; moderate increase in ostomy output; incontinence.    5 times         2. ONSET: \"When did the diarrhea begin?\"     Started about 30-45 minutes after taking medication        3. BM CONSISTENCY: \"How loose or watery is the diarrhea?\"         Loose and watery     4. VOMITING: \"Are you also vomiting?\" If so, ask: \"How many times in the past 24 hours?\"     NO         5. ABDOMINAL PAIN: \"Are you having any abdominal pain?\" If yes: \"What does it feel like?\" (e.g., crampy, dull, intermittent, constant)     NO pain. Slight discomfort in stomach this morning. Ate toast, it improved.        6. ABDOMINAL PAIN SEVERITY: If present, ask: \"How bad is the pain?\"  (e.g., Scale 1-10; mild, moderate, or severe)     - MILD (1-3): doesn't interfere " "with normal activities, abdomen soft and not tender to touch      - MODERATE (4-7): interferes with normal activities or awakens from sleep, tender to touch      - SEVERE (8-10): excruciating pain, doubled over, unable to do any normal activities      NO pain        7. ORAL INTAKE: If vomiting, \"Have you been able to drink liquids?\" \"How much fluids have you had in the past 24 hours?\"    Drinking okay     8. HYDRATION: \"Any signs of dehydration?\" (e.g., dry mouth [not just dry lips], too weak to stand, dizziness, new weight loss) \"When did you last urinate?\"    NO        9. EXPOSURE: \"Have you traveled to a foreign country recently?\" \"Have you been exposed to anyone with diarrhea?\" \"Could you have eaten any food that was spoiled?\"        Questions if it was [email or web address] from yesterday.   Also questioning if related to sertraline.    10. ANTIBIOTIC USE: \"Are you taking antibiotics now or have you taken antibiotics in the past 2 months?\"          NO    11. OTHER SYMPTOMS: \"Do you have any other symptoms?\" (e.g., fever, blood in stool)    Went back to sleep    Protocols used: DIARRHEA-A-OH      "

## 2020-04-28 NOTE — TELEPHONE ENCOUNTER
Lexapro sent.  Start by taking 1/2 tab of the 5 mg tab.  So, should be taking 2.5 mg daily (not 5 mg daily).  If 2.5 mg is tolerated, then move up to 5 mg tablet in 1 week.    Do not take Lexapro if Zoloft already taken today.      --Dr. Acevedo

## 2020-04-28 NOTE — TELEPHONE ENCOUNTER
Is she taking 25 or 50 mg of Zoloft daily?  If 50 mg, would like her to cut down to 25 mg.    If taking 25 mg.... we can switch her to Lexapro to see if that will be better tolerated.    Yes, serotonin specific reuptake inhibitor's can cause GI issues, so Zoloft might be causing this.

## 2020-04-30 PROBLEM — G47.00 INSOMNIA, UNSPECIFIED TYPE: Status: ACTIVE | Noted: 2020-04-30

## 2020-04-30 PROBLEM — F32.89 OTHER DEPRESSION: Status: ACTIVE | Noted: 2020-04-30

## 2020-07-30 ENCOUNTER — NURSE TRIAGE (OUTPATIENT)
Dept: FAMILY MEDICINE | Facility: CLINIC | Age: 70
End: 2020-07-30
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
## 2021-01-27 ENCOUNTER — TELEPHONE (OUTPATIENT)
Dept: FAMILY MEDICINE | Facility: CLINIC | Age: 71
End: 2021-01-27

## 2021-01-27 NOTE — TELEPHONE ENCOUNTER
----- Message from Winifred Goddard PA-C sent at 1/25/2021  3:49 PM CST -----  Former [facility] patient, due for annual (not appropriate for virtual visit). If unable to see me in [place], recommend she establish care at [facility] or [place].

## 2021-01-27 NOTE — LETTER
February 4, 2021      Racquel Torres  [address]  [facility] [address]        Dear Racquel,    I care about your health and have reviewed your health plan. I have reviewed your medical conditions, medication list, and lab results and am making recommendations based on this review, to better manage your health.    You are in particular need of attention regarding:  -Wellness (Physical) Visit     I am recommending that you:  -Please schedule an appointment with me for a physical, if you cannot see me in [place], I recommend you establish care at [facility] or [facility].    Here is a list of Health Maintenance topics that are due now or due soon:  Health Maintenance Due   Topic Date Due     Osteoporosis Screening  1950     Discuss Advance Care Planning  1950     Colorectal Cancer Screening  03/31/1962     Zoster (Shingles) Vaccine (1 of 2) 03/31/2002     Annual Wellness Visit  05/07/2019     FALL RISK ASSESSMENT  05/07/2019     Pneumococcal Vaccine (2 of 2 - PPSV23) 05/07/2019     Flu Vaccine (1) 09/01/2020     Depression Assessment  10/27/2020       Please call us at [phone] (or use [employer]) to address the above recommendations.     Thank you for trusting [facility] and we appreciate the opportunity to serve you.  We look forward to supporting your healthcare needs in the future.    Healthy Regards,    Winifred Goddard PA-C

## 2021-02-04 NOTE — TELEPHONE ENCOUNTER
Letter sent.    .Ruthy FUENTES    [facility] [place]

## 2022-10-03 NOTE — TELEPHONE ENCOUNTER
Patient Contact    Called patient and relayed provider message. She verbalizes understanding.        Breath sounds clear and equal bilaterally.